# Patient Record
Sex: MALE | Race: WHITE | Employment: UNEMPLOYED | URBAN - METROPOLITAN AREA
[De-identification: names, ages, dates, MRNs, and addresses within clinical notes are randomized per-mention and may not be internally consistent; named-entity substitution may affect disease eponyms.]

---

## 2017-05-16 ENCOUNTER — HOSPITAL ENCOUNTER (EMERGENCY)
Facility: MEDICAL CENTER | Age: 57
End: 2017-05-16
Attending: EMERGENCY MEDICINE
Payer: MEDICAID

## 2017-05-16 VITALS
HEIGHT: 70 IN | OXYGEN SATURATION: 97 % | HEART RATE: 60 BPM | SYSTOLIC BLOOD PRESSURE: 148 MMHG | BODY MASS INDEX: 20.76 KG/M2 | WEIGHT: 145 LBS | RESPIRATION RATE: 18 BRPM | TEMPERATURE: 97 F | DIASTOLIC BLOOD PRESSURE: 90 MMHG

## 2017-05-16 DIAGNOSIS — F32.89 OTHER DEPRESSION: ICD-10-CM

## 2017-05-16 PROCEDURE — 90791 PSYCH DIAGNOSTIC EVALUATION: CPT

## 2017-05-16 PROCEDURE — 99284 EMERGENCY DEPT VISIT MOD MDM: CPT

## 2017-05-16 ASSESSMENT — PAIN SCALES - GENERAL: PAINLEVEL_OUTOF10: 0

## 2017-05-16 NOTE — ED AVS SNAPSHOT
Unique Microguides Access Code: [email or web address]  Expires: 6/15/2017  5:06 PM    Your email address is not on file at [facility].  Email Addresses are required for you to sign up for Unique Microguides, please contact [phone] to verify your personal information and to provide your email address prior to attempting to register for Unique Microguides.    [place] [address]    Diamond Mindt  A secure, online tool to manage your health information     [facility]’s Unique Microguides® is a secure, online tool that connects you to your personalized health information from the privacy of your home -- day or night - making it very easy for you to manage your healthcare. Once the activation process is completed, you can even access your medical information using the Unique Microguides anika, which is available for free in the Apple Anika store or Google Play store.     To learn more about Unique Microguides, visit [email or web address]    There are two levels of access available (as shown below):   My Chart Features  [facility] Primary Care Doctor [facility]  Specialists [facility]  Urgent  Care Non-[facility] Primary Care Doctor   Email your healthcare team securely and privately 24/7 X X X    Manage appointments: schedule your next appointment; view details of past/upcoming appointments X      Request prescription refills. X      View recent personal medical records, including lab and immunizations X X X X   View health record, including health history, allergies, medications X X X X   Read reports about your outpatient visits, procedures, consult and ER notes X X X X   See your discharge summary, which is a recap of your hospital and/or ER visit that includes your diagnosis, lab results, and care plan X X  X     How to register for [employer] Mindt:  Once your e-mail address has been verified, follow the following steps to sign up for Unique Microguides.     1. Go to  [email or web address]  2. Click on the Sign Up Now box, which takes you to the New Member Sign Up page. You will  need to provide the following information:  a. Enter your Threadflip Access Code exactly as it appears at the top of this page. (You will not need to use this code after you’ve completed the sign-up process. If you do not sign up before the expiration date, you must request a new code.)   b. Enter your date of birth.   c. Enter your home email address.   d. Click Submit, and follow the next screen’s instructions.  3. Create a [employer] ID. This will be your Threadflip login ID and cannot be changed, so think of one that is secure and easy to remember.  4. Create a Threadflip password. You can change your password at any time.  5. Enter your Password Reset Question and Answer. This can be used at a later time if you forget your password.   6. Enter your e-mail address. This allows you to receive e-mail notifications when new information is available in Threadflip.  7. Click Sign Up. You can now view your health information.    For assistance activating your Threadflip account, call [phone]

## 2017-05-16 NOTE — ED AVS SNAPSHOT
5/16/2017    Wilfrid Briceño  [facility] [address]    Dear Wilfrid:    [facility] wants to ensure your discharge home is safe and you or your loved ones have had all of your questions answered regarding your care after you leave the hospital.    Below is a list of resources and contact information should you have any questions regarding your hospital stay, follow-up instructions, or active medical symptoms.    Questions or Concerns Regarding… Contact   Medical Questions Related to Your Discharge  (7 days a week, 8am-5pm) Contact a Nurse Care Coordinator   [phone]   Medical Questions Not Related to Your Discharge  (24 hours a day / 7 days a week)  Contact the Nurse Health Line   [phone]    Medications or Discharge Instructions Refer to your discharge packet   or contact your [facility] Primary Care Provider   [phone]   Follow-up Appointment(s) Schedule your appointment via [email or web address]   or contact Scheduling [phone]   Billing Review your statement via [email or web address]  or contact Billing [phone]   Medical Records Review your records via [email or web address]   or contact Medical Records [phone]     You may receive a telephone call within two days of discharge. This call is to make certain you understand your discharge instructions and have the opportunity to have any questions answered. You can also easily access your medical information, test results and upcoming appointments via the [email or web address] free online health management tool. You can learn more and sign up at [email or web address]. For assistance setting up your [email or web address] account, please call [phone].    Once again, we want to ensure your discharge home is safe and that you have a clear understanding of any next steps in your care. If you have any questions or concerns, please do not hesitate to contact us, we are here for you. Thank you for choosing [facility] for your healthcare needs.    Sincerely,    Your [facility] Healthcare Team

## 2017-05-16 NOTE — DISCHARGE INSTRUCTIONS
Depression, Adult  Depression refers to feeling sad, low, down in the dumps, blue, gloomy, or empty. In general, there are two kinds of depression:  1. Normal sadness or normal grief. This kind of depression is one that we all feel from time to time after upsetting life experiences, such as the loss of a job or the ending of a relationship. This kind of depression is considered normal, is short lived, and resolves within a few days to 2 weeks. Depression experienced after the loss of a loved one (bereavement) often lasts longer than 2 weeks but normally gets better with time.  2. Clinical depression. This kind of depression lasts longer than normal sadness or normal grief or interferes with your ability to function at home, at work, and in school. It also interferes with your personal relationships. It affects almost every aspect of your life. Clinical depression is an illness.  Symptoms of depression can also be caused by conditions other than those mentioned above, such as:  · Physical illness. Some physical illnesses, including underactive thyroid gland (hypothyroidism), severe anemia, specific types of cancer, diabetes, uncontrolled seizures, heart and lung problems, strokes, and chronic pain are commonly associated with symptoms of depression.  · Side effects of some prescription medicine. In some people, certain types of medicine can cause symptoms of depression.  · Substance abuse. Abuse of alcohol and illicit drugs can cause symptoms of depression.  SYMPTOMS  Symptoms of normal sadness and normal grief include the following:  · Feeling sad or crying for short periods of time.  · Not caring about anything (apathy).  · Difficulty sleeping or sleeping too much.  · No longer able to enjoy the things you used to enjoy.  · Desire to be by oneself all the time (social isolation).  · Lack of energy or motivation.  · Difficulty concentrating or remembering.  · Change in appetite or weight.  · Restlessness or  agitation.  Symptoms of clinical depression include the same symptoms of normal sadness or normal grief and also the following symptoms:  · Feeling sad or crying all the time.  · Feelings of guilt or worthlessness.  · Feelings of hopelessness or helplessness.  · Thoughts of suicide or the desire to harm yourself (suicidal ideation).  · Loss of touch with reality (psychotic symptoms). Seeing or hearing things that are not real (hallucinations) or having false beliefs about your life or the people around you (delusions and paranoia).  DIAGNOSIS   The diagnosis of clinical depression is usually based on how bad the symptoms are and how long they have lasted. Your health care provider will also ask you questions about your medical history and substance use to find out if physical illness, use of prescription medicine, or substance abuse is causing your depression. Your health care provider may also order blood tests.  TREATMENT   Often, normal sadness and normal grief do not require treatment. However, sometimes antidepressant medicine is given for bereavement to ease the depressive symptoms until they resolve.  The treatment for clinical depression depends on how bad the symptoms are but often includes antidepressant medicine, counseling with a mental health professional, or both. Your health care provider will help to determine what treatment is best for you.  Depression caused by physical illness usually goes away with appropriate medical treatment of the illness. If prescription medicine is causing depression, talk with your health care provider about stopping the medicine, decreasing the dose, or changing to another medicine.  Depression caused by the abuse of alcohol or illicit drugs goes away when you stop using these substances. Some adults need professional help in order to stop drinking or using drugs.  SEEK IMMEDIATE MEDICAL CARE IF:  · You have thoughts about hurting yourself or others.  · You lose touch  with reality (have psychotic symptoms).  · You are taking medicine for depression and have a serious side effect.  FOR MORE INFORMATION  · National [place] on Mental Illness: www.coleman.org   · National [place] of Mental Health: www.nimh.nih.gov      This information is not intended to replace advice given to you by your health care provider. Make sure you discuss any questions you have with your health care provider.     Document Released: 12/15/2001 Document Revised: 01/08/2016 Document Reviewed: 03/18/2013  Elsevier Interactive Patient Education ©2016 Elsevier Inc.

## 2017-05-16 NOTE — ED PROVIDER NOTES
"ED Provider Note    Scribed for Christiano Forte M.D. by Familia Hunter. 5/16/2017, 4:08 PM.    Primary care provider: Pcp Pt States None  Means of arrival: Law Enforcement   History obtained from: Patient, Law Enforcement   History limited by: None     CHIEF COMPLAINT  Chief Complaint   Patient presents with   • Suicidal Ideation     Pt bib police. Pt released from [facility], reported to staff that he was \"suicidal\" and \"sometimes thought of standing in the way of a moving bus\". Pt denies any SI/HI. PT remained incredibly cooperative until the police overs removed shackles and left. Pt then became very upest and verbally aggressive with staff.  present. Pt educated what a legal 2000 is and that he cannot leave. PT demanding to go back to [facility]. Pt is a flight risk. Pt belongings removed from room/patient.   • Psych Eval     Potentionally dangerous items removed from room. Police report a knife in patient bag. Security searched bag and could not find knife. Pt states \"I ain't saying another word to no one if you are gonna keep me here\". Pt sitting in bed with arms crossed.        HPI  Wilfrid Briceño is a 56 y.o. male who was brought by Law Enforcement to the Emergency Department after being released from [facility]. Per nursing note, patient had complained of prior suicidal thoughts to [facility] staff. However, currently patient is denying any thoughts of suicidal ideation. He reports his prior suicidal thoughts were not not meant, currently manuel for safety. The patient has been in [facility] for the last 3 days secondary to trespassing. He denies any homicidal ideation or hallucinations.     REVIEW OF SYSTEMS  Pertinent negatives include no suicidal ideation, homicidal ideation, hallucinations. As above, all other systems reviewed and are negative.   See HPI for further details.     PAST MEDICAL HISTORY  No chronic past medical history.     SURGICAL HISTORY  patient denies any surgical history    SOCIAL " "HISTORY  Social History   Substance Use Topics   • Smoking status: Current Every Day Smoker -- 0.50 packs/day     Types: Cigarettes   • Smokeless tobacco: None   • Alcohol Use: No      History   Drug Use No       FAMILY HISTORY  History reviewed. No pertinent family history.    CURRENT MEDICATIONS  Home Medications     Reviewed by Lucrecia Ma R.N. (Registered Nurse) on 05/16/17 at 1542  Med List Status: Partial    Medication Last Dose Status    diphenhydrAMINE (BENADRYL) 25 MG capsule  Active                ALLERGIES  No Known Allergies    PHYSICAL EXAM  VITAL SIGNS: /90 mmHg  Pulse 60  Temp(Src) 36.1 °C (97 °F)  Resp 18  Ht 1.778 m (5' 10\")  Wt 65.772 kg (145 lb)  BMI 20.81 kg/m2  SpO2 97%  Vitals reviewed.  Constitutional: Alert, no apparent distress.   HENT: No signs of trauma, Bilateral external ears normal, Nose normal.   Eyes: Pupils are equal and reactive, Conjunctiva normal, Non-icteric.   Neck: Normal range of motion, No tenderness, Supple, No stridor.   Lymphatic: No lymphadenopathy noted.   Cardiovascular: Regular rate and rhythm, no murmurs.   Thorax & Lungs: Normal breath sounds, No respiratory distress, No wheezing, No chest tenderness.   Abdomen: Bowel sounds normal, Soft, No tenderness, No peritoneal signs, No masses, No pulsatile masses.   Skin: Warm, Dry, No erythema, No rash.   Extremities: Intact distal pulses, No edema, No tenderness, No cyanosis  Musculoskeletal: Good range of motion in all major joints. No tenderness to palpation or major deformities noted.   Neurologic: Alert , Normal motor function, Normal sensory function, No focal deficits noted.   Psychiatric: Denies suicidal thoughts. Normal affect.      COURSE & MEDICAL DECISION MAKING  Nursing notes, VS, PMSFHx reviewed in chart.    4:08 PM Patient seen and examined at bedside. [facility] will consult with patient for evaluation of his status. No breathalyzer ordered as patient has been in [facility] for the last 3 days, " and arrives to this ED directly from longterm.     4:42 PM Spoke with Kelli Thornton, about the patient's condition. After speaking with [facility] about their assessment of patient, they are agreeable that patient is stable at this time for discharge. They have given the patient resources for depression, he will return for suicidal thoughts and is manuel for safety.         The patient is referred to a primary physician for blood pressure management, diabetic screening, and for all other preventative health concerns.    Patient was referred to [facility] to establish a primary care physician.       DISPOSITION:  Patient will be discharged home in stable condition.    FOLLOW UP:  [facility], Emergency Dept  [address] [facility] [id number] [phone]    If symptoms worsen    [facility]  [address] [address]  [phone]    call for follow up      FINAL IMPRESSION  1. Other depression          Familia NUNEZ (Scottiblisa), am scribing for, and in the presence of, Christiano Forte M.D..    Electronically signed by: Familia Hunter (Mikey), 5/16/2017    IChristiano M.D. personally performed the services described in this documentation, as scribed by Familia Hunter in my presence, and it is both accurate and complete.    The note accurately reflects work and decisions made by me.  Christiano Forte  5/16/2017  5:28 PM

## 2017-05-16 NOTE — ED AVS SNAPSHOT
Home Care Instructions                                                                                                                Wilfrid Briceño   MRN: [id number]    Department:  [facility], Emergency Dept   Date of Visit:  5/16/2017            [facility], Emergency Dept    [address] [address]    Phone:  [phone]      You were seen by     Christiano Forte M.D.      Your Diagnosis Was     Other depression     F32.89       Follow-up Information     1. Follow up with [facility], Emergency Dept.    Specialty:  Emergency Medicine    Why:  If symptoms worsen    Contact information    [address] [place] [id number] [phone]        2. Follow up with [facility].    Why:  call for follow up    Contact information    [address] [place] [address] [phone]      Medication Information     Review all of your home medications and newly ordered medications with your primary doctor and/or pharmacist as soon as possible. Follow medication instructions as directed by your doctor and/or pharmacist.     Please keep your complete medication list with you and share with your physician. Update the information when medications are discontinued, doses are changed, or new medications (including over-the-counter products) are added; and carry medication information at all times in the event of emergency situations.               Medication List      ASK your doctor about these medications        Instructions    Morning Afternoon Evening Bedtime    diphenhydrAMINE 25 MG capsule   Commonly known as:  BENADRYL        Take 1-2 Caps by mouth every 6 hours as needed for Itching or Rash.   Dose:  25-50 mg                                Procedures and tests performed during your visit     ED CONSULT TO BEHAVIORAL HEALTH        Discharge Instructions       Depression, Adult  Depression refers to feeling sad, low, down in the  dumps, blue, gloomy, or empty. In general, there are two kinds of depression:  1. Normal sadness or normal grief. This kind of depression is one that we all feel from time to time after upsetting life experiences, such as the loss of a job or the ending of a relationship. This kind of depression is considered normal, is short lived, and resolves within a few days to 2 weeks. Depression experienced after the loss of a loved one (bereavement) often lasts longer than 2 weeks but normally gets better with time.  2. Clinical depression. This kind of depression lasts longer than normal sadness or normal grief or interferes with your ability to function at home, at work, and in school. It also interferes with your personal relationships. It affects almost every aspect of your life. Clinical depression is an illness.  Symptoms of depression can also be caused by conditions other than those mentioned above, such as:  · Physical illness. Some physical illnesses, including underactive thyroid gland (hypothyroidism), severe anemia, specific types of cancer, diabetes, uncontrolled seizures, heart and lung problems, strokes, and chronic pain are commonly associated with symptoms of depression.  · Side effects of some prescription medicine. In some people, certain types of medicine can cause symptoms of depression.  · Substance abuse. Abuse of alcohol and illicit drugs can cause symptoms of depression.  SYMPTOMS  Symptoms of normal sadness and normal grief include the following:  · Feeling sad or crying for short periods of time.  · Not caring about anything (apathy).  · Difficulty sleeping or sleeping too much.  · No longer able to enjoy the things you used to enjoy.  · Desire to be by oneself all the time (social isolation).  · Lack of energy or motivation.  · Difficulty concentrating or remembering.  · Change in appetite or weight.  · Restlessness or agitation.  Symptoms of clinical depression include the same symptoms of  normal sadness or normal grief and also the following symptoms:  · Feeling sad or crying all the time.  · Feelings of guilt or worthlessness.  · Feelings of hopelessness or helplessness.  · Thoughts of suicide or the desire to harm yourself (suicidal ideation).  · Loss of touch with reality (psychotic symptoms). Seeing or hearing things that are not real (hallucinations) or having false beliefs about your life or the people around you (delusions and paranoia).  DIAGNOSIS   The diagnosis of clinical depression is usually based on how bad the symptoms are and how long they have lasted. Your health care provider will also ask you questions about your medical history and substance use to find out if physical illness, use of prescription medicine, or substance abuse is causing your depression. Your health care provider may also order blood tests.  TREATMENT   Often, normal sadness and normal grief do not require treatment. However, sometimes antidepressant medicine is given for bereavement to ease the depressive symptoms until they resolve.  The treatment for clinical depression depends on how bad the symptoms are but often includes antidepressant medicine, counseling with a mental health professional, or both. Your health care provider will help to determine what treatment is best for you.  Depression caused by physical illness usually goes away with appropriate medical treatment of the illness. If prescription medicine is causing depression, talk with your health care provider about stopping the medicine, decreasing the dose, or changing to another medicine.  Depression caused by the abuse of alcohol or illicit drugs goes away when you stop using these substances. Some adults need professional help in order to stop drinking or using drugs.  SEEK IMMEDIATE MEDICAL CARE IF:  · You have thoughts about hurting yourself or others.  · You lose touch with reality (have psychotic symptoms).  · You are taking medicine for  depression and have a serious side effect.  FOR MORE INFORMATION  · National [place] on Mental Illness: www.coleman.org   · National [place] of Mental Health: www.nimh.nih.gov      This information is not intended to replace advice given to you by your health care provider. Make sure you discuss any questions you have with your health care provider.     Document Released: 12/15/2001 Document Revised: 01/08/2016 Document Reviewed: 03/18/2013  [employer] Interactive Patient Education ©2016 [employer]            Patient Information     Patient Information    Following emergency treatment: all patient requiring follow-up care must return either to a private physician or a clinic if your condition worsens before you are able to obtain further medical attention, please return to the emergency room.     Billing Information    At [facility], we work to make the billing process streamlined for our patients.  Our Representatives are here to answer any questions you may have regarding your hospital bill.  If you have insurance coverage and have supplied your insurance information to us, we will submit a claim to your insurer on your behalf.  Should you have any questions regarding your bill, we can be reached online or by phone as follows:  Online: You are able pay your bills online or live chat with our representatives about any billing questions you may have. We are here to help Monday - Friday from 8:00am to 7:30pm and 9:00am - 12:00pm on Saturdays.  Please visit [email or web address]  for more information.   Phone:  [phone] or [phone]    Please note that your emergency physician, surgeon, pathologist, radiologist, anesthesiologist, and other specialists are not employed by [facility] and will therefore bill separately for their services.  Please contact them directly for any questions concerning their bills at the numbers below:     Emergency Physician Services:   [phone]  [facility]:  [phone]  [facility]:  [phone]  [facility]:  [phone]    1. Your final bill may vary from the amount quoted upon discharge if all procedures are not complete at that time, or if your doctor has additional procedures of which we are not aware. You will receive an additional bill if you return to the Emergency Department at [facility] for suture removal regardless of the facility of which the sutures were placed.     2. Please arrange for settlement of this account at the emergency registration.    3. All self-pay accounts are due in full at the time of treatment.  If you are unable to meet this obligation then payment is expected within 4-5 days.     4. If you have had radiology studies (CT, X-ray, Ultrasound, MRI), you have received a preliminary result during your emergency department visit. Please contact the radiology department [phone] to receive a copy of your final result. Please discuss the Final result with your primary physician or with the follow up physician provided.     Crisis Hotline:  [place] Crisis Hotline:  2-126-EMUBVAA or 1-584.899.3005  [place] Crisis Hotline:    1-884.822.9903 or 505-931-0539         ED Discharge Follow Up Questions    1. In order to provide you with very good care, we would like to follow up with a phone call in the next few days.  May we have your permission to contact you?     YES /  NO    2. What is the best phone number to call you? (       )_____-__________    3. What is the best time to call you?      Morning  /  Afternoon  /  Evening                   Patient Signature:  ____________________________________________________________    Date:  ____________________________________________________________

## 2017-05-17 NOTE — ED NOTES
Pt denies SI/HI at this time. Pt informed that if at any point, he began having suicidal thoughts, he needs to return. Verbalized understanding and agreed. Pt agreed not to hurt himself. Ambulatory, steady gait.
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
## 2017-05-17 NOTE — ED NOTES
"PT given back his personal belongings, pt is extremely apologetic for his behavior upon arrival. PT tearful that he was so \"mean and disrespectful to me\". Pt consoled and provided emotional support. Food offered to patient.   "

## 2018-12-03 ENCOUNTER — HOSPITAL ENCOUNTER (EMERGENCY)
Facility: MEDICAL CENTER | Age: 58
End: 2018-12-03
Attending: EMERGENCY MEDICINE

## 2018-12-03 ENCOUNTER — APPOINTMENT (OUTPATIENT)
Dept: RADIOLOGY | Facility: MEDICAL CENTER | Age: 58
End: 2018-12-03
Attending: EMERGENCY MEDICINE

## 2018-12-03 VITALS
DIASTOLIC BLOOD PRESSURE: 91 MMHG | HEART RATE: 88 BPM | RESPIRATION RATE: 16 BRPM | WEIGHT: 145 LBS | HEIGHT: 70 IN | TEMPERATURE: 97.5 F | BODY MASS INDEX: 20.76 KG/M2 | SYSTOLIC BLOOD PRESSURE: 155 MMHG | OXYGEN SATURATION: 91 %

## 2018-12-03 DIAGNOSIS — S22.42XA CLOSED FRACTURE OF MULTIPLE RIBS OF LEFT SIDE, INITIAL ENCOUNTER: ICD-10-CM

## 2018-12-03 PROCEDURE — 700102 HCHG RX REV CODE 250 W/ 637 OVERRIDE(OP): Performed by: EMERGENCY MEDICINE

## 2018-12-03 PROCEDURE — 71101 X-RAY EXAM UNILAT RIBS/CHEST: CPT | Mod: LT

## 2018-12-03 PROCEDURE — A9270 NON-COVERED ITEM OR SERVICE: HCPCS | Performed by: EMERGENCY MEDICINE

## 2018-12-03 PROCEDURE — 99284 EMERGENCY DEPT VISIT MOD MDM: CPT

## 2018-12-03 RX ORDER — OXYCODONE HYDROCHLORIDE AND ACETAMINOPHEN 5; 325 MG/1; MG/1
1 TABLET ORAL ONCE
Status: COMPLETED | OUTPATIENT
Start: 2018-12-03 | End: 2018-12-03

## 2018-12-03 RX ORDER — OXYCODONE HYDROCHLORIDE AND ACETAMINOPHEN 5; 325 MG/1; MG/1
2 TABLET ORAL ONCE
Status: COMPLETED | OUTPATIENT
Start: 2018-12-03 | End: 2018-12-03

## 2018-12-03 RX ORDER — OXYCODONE HYDROCHLORIDE AND ACETAMINOPHEN 5; 325 MG/1; MG/1
1-2 TABLET ORAL EVERY 6 HOURS PRN
Qty: 40 TAB | Refills: 0 | Status: SHIPPED | OUTPATIENT
Start: 2018-12-03 | End: 2018-12-04

## 2018-12-03 RX ADMIN — OXYCODONE AND ACETAMINOPHEN 1 TABLET: 5; 325 TABLET ORAL at 17:45

## 2018-12-03 RX ADMIN — OXYCODONE AND ACETAMINOPHEN 1 TABLET: 5; 325 TABLET ORAL at 17:30
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
## 2018-12-04 ENCOUNTER — APPOINTMENT (OUTPATIENT)
Dept: RADIOLOGY | Facility: MEDICAL CENTER | Age: 58
End: 2018-12-04
Attending: EMERGENCY MEDICINE

## 2018-12-04 ENCOUNTER — HOSPITAL ENCOUNTER (EMERGENCY)
Facility: MEDICAL CENTER | Age: 58
End: 2018-12-05
Attending: EMERGENCY MEDICINE

## 2018-12-04 VITALS
WEIGHT: 145 LBS | BODY MASS INDEX: 20.81 KG/M2 | OXYGEN SATURATION: 90 % | TEMPERATURE: 98 F | HEART RATE: 65 BPM | SYSTOLIC BLOOD PRESSURE: 130 MMHG | RESPIRATION RATE: 16 BRPM | DIASTOLIC BLOOD PRESSURE: 88 MMHG

## 2018-12-04 DIAGNOSIS — S22.42XA CLOSED FRACTURE OF MULTIPLE RIBS OF LEFT SIDE, INITIAL ENCOUNTER: ICD-10-CM

## 2018-12-04 DIAGNOSIS — R09.02 HYPOXIA: ICD-10-CM

## 2018-12-04 LAB
ALBUMIN SERPL BCP-MCNC: 4.4 G/DL (ref 3.2–4.9)
ALBUMIN/GLOB SERPL: 1.5 G/DL
ALP SERPL-CCNC: 82 U/L (ref 30–99)
ALT SERPL-CCNC: 14 U/L (ref 2–50)
ANION GAP SERPL CALC-SCNC: 5 MMOL/L (ref 0–11.9)
APTT PPP: 28.5 SEC (ref 24.7–36)
AST SERPL-CCNC: 19 U/L (ref 12–45)
BASOPHILS # BLD AUTO: 0.7 % (ref 0–1.8)
BASOPHILS # BLD: 0.07 K/UL (ref 0–0.12)
BILIRUB SERPL-MCNC: 0.7 MG/DL (ref 0.1–1.5)
BUN SERPL-MCNC: 17 MG/DL (ref 8–22)
CALCIUM SERPL-MCNC: 9.8 MG/DL (ref 8.5–10.5)
CHLORIDE SERPL-SCNC: 102 MMOL/L (ref 96–112)
CO2 SERPL-SCNC: 30 MMOL/L (ref 20–33)
CREAT SERPL-MCNC: 0.97 MG/DL (ref 0.5–1.4)
EOSINOPHIL # BLD AUTO: 0.15 K/UL (ref 0–0.51)
EOSINOPHIL NFR BLD: 1.4 % (ref 0–6.9)
ERYTHROCYTE [DISTWIDTH] IN BLOOD BY AUTOMATED COUNT: 42.9 FL (ref 35.9–50)
GLOBULIN SER CALC-MCNC: 3 G/DL (ref 1.9–3.5)
GLUCOSE SERPL-MCNC: 93 MG/DL (ref 65–99)
HCT VFR BLD AUTO: 51.3 % (ref 42–52)
HGB BLD-MCNC: 17.4 G/DL (ref 14–18)
IMM GRANULOCYTES # BLD AUTO: 0.05 K/UL (ref 0–0.11)
IMM GRANULOCYTES NFR BLD AUTO: 0.5 % (ref 0–0.9)
INR PPP: 1 (ref 0.87–1.13)
LYMPHOCYTES # BLD AUTO: 2.28 K/UL (ref 1–4.8)
LYMPHOCYTES NFR BLD: 21.4 % (ref 22–41)
MCH RBC QN AUTO: 31 PG (ref 27–33)
MCHC RBC AUTO-ENTMCNC: 33.9 G/DL (ref 33.7–35.3)
MCV RBC AUTO: 91.4 FL (ref 81.4–97.8)
MONOCYTES # BLD AUTO: 0.81 K/UL (ref 0–0.85)
MONOCYTES NFR BLD AUTO: 7.6 % (ref 0–13.4)
NEUTROPHILS # BLD AUTO: 7.28 K/UL (ref 1.82–7.42)
NEUTROPHILS NFR BLD: 68.4 % (ref 44–72)
NRBC # BLD AUTO: 0 K/UL
NRBC BLD-RTO: 0 /100 WBC
PLATELET # BLD AUTO: 399 K/UL (ref 164–446)
PMV BLD AUTO: 9.3 FL (ref 9–12.9)
POTASSIUM SERPL-SCNC: 4.3 MMOL/L (ref 3.6–5.5)
PROT SERPL-MCNC: 7.4 G/DL (ref 6–8.2)
PROTHROMBIN TIME: 13.3 SEC (ref 12–14.6)
RBC # BLD AUTO: 5.61 M/UL (ref 4.7–6.1)
SODIUM SERPL-SCNC: 137 MMOL/L (ref 135–145)
WBC # BLD AUTO: 10.6 K/UL (ref 4.8–10.8)

## 2018-12-04 PROCEDURE — 700102 HCHG RX REV CODE 250 W/ 637 OVERRIDE(OP): Performed by: EMERGENCY MEDICINE

## 2018-12-04 PROCEDURE — 85610 PROTHROMBIN TIME: CPT

## 2018-12-04 PROCEDURE — 85730 THROMBOPLASTIN TIME PARTIAL: CPT

## 2018-12-04 PROCEDURE — 700101 HCHG RX REV CODE 250: Performed by: EMERGENCY MEDICINE

## 2018-12-04 PROCEDURE — A9270 NON-COVERED ITEM OR SERVICE: HCPCS | Performed by: EMERGENCY MEDICINE

## 2018-12-04 PROCEDURE — 700117 HCHG RX CONTRAST REV CODE 255: Performed by: EMERGENCY MEDICINE

## 2018-12-04 PROCEDURE — 700111 HCHG RX REV CODE 636 W/ 250 OVERRIDE (IP): Performed by: EMERGENCY MEDICINE

## 2018-12-04 PROCEDURE — 304561 HCHG STAT O2

## 2018-12-04 PROCEDURE — 99284 EMERGENCY DEPT VISIT MOD MDM: CPT

## 2018-12-04 PROCEDURE — 80053 COMPREHEN METABOLIC PANEL: CPT

## 2018-12-04 PROCEDURE — 71260 CT THORAX DX C+: CPT

## 2018-12-04 PROCEDURE — 85025 COMPLETE CBC W/AUTO DIFF WBC: CPT

## 2018-12-04 PROCEDURE — 96375 TX/PRO/DX INJ NEW DRUG ADDON: CPT

## 2018-12-04 PROCEDURE — 96374 THER/PROPH/DIAG INJ IV PUSH: CPT

## 2018-12-04 RX ORDER — LIDOCAINE 50 MG/G
1 PATCH TOPICAL EVERY 24 HOURS
Qty: 10 PATCH | Refills: 0 | Status: SHIPPED | OUTPATIENT
Start: 2018-12-04

## 2018-12-04 RX ORDER — MORPHINE SULFATE 10 MG/ML
6 INJECTION, SOLUTION INTRAMUSCULAR; INTRAVENOUS ONCE
Status: COMPLETED | OUTPATIENT
Start: 2018-12-04 | End: 2018-12-04

## 2018-12-04 RX ORDER — LIDOCAINE 50 MG/G
2 PATCH TOPICAL ONCE
Status: DISCONTINUED | OUTPATIENT
Start: 2018-12-04 | End: 2018-12-05 | Stop reason: HOSPADM

## 2018-12-04 RX ORDER — GABAPENTIN 400 MG/1
400 CAPSULE ORAL 3 TIMES DAILY
Qty: 30 CAP | Refills: 0 | Status: SHIPPED | OUTPATIENT
Start: 2018-12-04

## 2018-12-04 RX ORDER — OXYCODONE HYDROCHLORIDE AND ACETAMINOPHEN 5; 325 MG/1; MG/1
1 TABLET ORAL ONCE
Status: COMPLETED | OUTPATIENT
Start: 2018-12-04 | End: 2018-12-04

## 2018-12-04 RX ORDER — ONDANSETRON 2 MG/ML
4 INJECTION INTRAMUSCULAR; INTRAVENOUS ONCE
Status: COMPLETED | OUTPATIENT
Start: 2018-12-04 | End: 2018-12-04

## 2018-12-04 RX ORDER — IBUPROFEN 600 MG/1
600 TABLET ORAL EVERY 6 HOURS PRN
Qty: 15 TAB | Refills: 0 | Status: SHIPPED | OUTPATIENT
Start: 2018-12-04

## 2018-12-04 RX ADMIN — ONDANSETRON 4 MG: 2 INJECTION INTRAMUSCULAR; INTRAVENOUS at 18:10

## 2018-12-04 RX ADMIN — MORPHINE SULFATE 6 MG: 10 INJECTION INTRAVENOUS at 18:11

## 2018-12-04 RX ADMIN — FENTANYL CITRATE 25 MCG: 50 INJECTION, SOLUTION INTRAMUSCULAR; INTRAVENOUS at 20:15

## 2018-12-04 RX ADMIN — OXYCODONE AND ACETAMINOPHEN 1 TABLET: 5; 325 TABLET ORAL at 21:54

## 2018-12-04 RX ADMIN — LIDOCAINE 2 PATCH: 50 PATCH TOPICAL at 22:40

## 2018-12-04 RX ADMIN — IOHEXOL 100 ML: 350 INJECTION, SOLUTION INTRAVENOUS at 20:06

## 2018-12-04 RX ADMIN — FENTANYL CITRATE 75 MCG: 50 INJECTION, SOLUTION INTRAMUSCULAR; INTRAVENOUS at 20:00

## 2018-12-04 ASSESSMENT — PAIN SCALES - GENERAL
PAINLEVEL_OUTOF10: 4
PAINLEVEL_OUTOF10: 4
PAINLEVEL_OUTOF10: ASSUMED PAIN PRESENT

## 2018-12-04 NOTE — DISCHARGE PLANNING
Pt does not have Medicaid and refused to speak with PAR when they went in room.  Pt will be admitted for pain control.

## 2018-12-05 NOTE — ED NOTES
Patient discharged with instructions for rib fracures with prescriptions x3 signs and symptoms explained to patient for reasons to return to emergency department, Patient is understanding and has no further questions. Social work visited pt and provided him with information for prescription assistance, pt telling RN and staff to get out of the room and leave him alone. Pt ambulatory with a slow steady gait,  offered to call shelter and provide paula pass but pt refused.

## 2018-12-05 NOTE — ED NOTES
Report given to Lynne JENKINS; pt to be transferred to [facility] due to order for cardiac monitoring.

## 2018-12-05 NOTE — ED NOTES
Call from CT tech at 1955 for patient not being able to tolerate scan related to pain. Telephone order to give 75 mcg fentanyl by Dr. Ganser and additional Fentanyl if needed. RN responded to CT scanner and provided pain medications. CT needed patient off table to scan another person. Pt moved to [facility] by Tech and RN with help of CT. Pt given additional 25 mcg fentanyl prior to moving back onto CT table. Pt was able to tolerate scan. RN brought patient back to
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
## 2018-12-05 NOTE — ED NOTES
PIV initiated.  Pt medicated per mar.  Pt being moved to yellow pod for cardiac monitoring.  Report to Lynne JENKINS.
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
## 2018-12-05 NOTE — ED NOTES
Rounded on patient.  Pt resting comfortably.  Pt placed on o2 @ 2lpm n/c for spo2 88-89%.  Pt awaiting CT scan, updated on POC.  All needs met.
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
## 2020-04-10 ENCOUNTER — HOSPITAL ENCOUNTER (EMERGENCY)
Facility: MEDICAL CENTER | Age: 60
End: 2020-04-10
Attending: EMERGENCY MEDICINE
Payer: MEDICAID

## 2020-04-10 ENCOUNTER — APPOINTMENT (OUTPATIENT)
Dept: RADIOLOGY | Facility: MEDICAL CENTER | Age: 60
End: 2020-04-10
Attending: EMERGENCY MEDICINE
Payer: MEDICAID

## 2020-04-10 VITALS
SYSTOLIC BLOOD PRESSURE: 123 MMHG | TEMPERATURE: 97.1 F | DIASTOLIC BLOOD PRESSURE: 88 MMHG | OXYGEN SATURATION: 100 % | RESPIRATION RATE: 18 BRPM | HEIGHT: 70 IN | WEIGHT: 153 LBS | HEART RATE: 97 BPM | BODY MASS INDEX: 21.9 KG/M2

## 2020-04-10 DIAGNOSIS — M62.830 BACK SPASM: ICD-10-CM

## 2020-04-10 DIAGNOSIS — M54.50 LOW BACK PAIN, UNSPECIFIED BACK PAIN LATERALITY, UNSPECIFIED CHRONICITY, UNSPECIFIED WHETHER SCIATICA PRESENT: ICD-10-CM

## 2020-04-10 PROCEDURE — 99284 EMERGENCY DEPT VISIT MOD MDM: CPT

## 2020-04-10 PROCEDURE — 71045 X-RAY EXAM CHEST 1 VIEW: CPT

## 2020-04-10 PROCEDURE — 700102 HCHG RX REV CODE 250 W/ 637 OVERRIDE(OP): Performed by: EMERGENCY MEDICINE

## 2020-04-10 PROCEDURE — A9270 NON-COVERED ITEM OR SERVICE: HCPCS | Performed by: EMERGENCY MEDICINE

## 2020-04-10 RX ORDER — CYCLOBENZAPRINE HCL 5 MG
5-10 TABLET ORAL 3 TIMES DAILY PRN
Qty: 30 TAB | Refills: 0 | Status: SHIPPED | OUTPATIENT
Start: 2020-04-10

## 2020-04-10 RX ORDER — CYCLOBENZAPRINE HCL 10 MG
10 TABLET ORAL ONCE
Status: COMPLETED | OUTPATIENT
Start: 2020-04-10 | End: 2020-04-10

## 2020-04-10 RX ORDER — ACETAMINOPHEN 325 MG/1
650 TABLET ORAL ONCE
Status: COMPLETED | OUTPATIENT
Start: 2020-04-10 | End: 2020-04-10

## 2020-04-10 RX ADMIN — CYCLOBENZAPRINE HYDROCHLORIDE 10 MG: 10 TABLET, FILM COATED ORAL at 11:17

## 2020-04-10 RX ADMIN — ACETAMINOPHEN 650 MG: 325 TABLET, FILM COATED ORAL at 09:55

## 2020-04-10 ASSESSMENT — FIBROSIS 4 INDEX: FIB4 SCORE: 0.74

## 2020-04-10 NOTE — ED PROVIDER NOTES
ED Provider Note    ER PROVIDER NOTE      CHIEF COMPLAINT  Chief Complaint   Patient presents with   • Sore Throat   • Cough       HPI  Wilfrid Briceño is a 59 y.o. male who presents to the emergency department complaining of back spasms.  Patient reports that over the last day he has had increased back pain and some spasm.  He reports it is bilateral, low, there is no radiation.  Seems to be worse with twisting or moving.  States he has had issues with his back before although denies any new injury.  He denies any focal weakness numbness or tingling.  No abdominal pain nausea vomiting.  No fevers or chills.  No abnormal weight loss.  No bowel or bladder incontinence or retention.  Per triage note, patient was reporting some cough and sore throat, patient denies this to me    REVIEW OF SYSTEMS  Pertinent positives include back pain. Pertinent negatives include no weakness or numbness. See HPI for details. All other systems reviewed and are negative.    PAST MEDICAL HISTORY       SURGICAL HISTORY  patient denies any surgical history    FAMILY HISTORY  No family history on file.    SOCIAL HISTORY  Social History     Socioeconomic History   • Marital status: Single     Spouse name: Not on file   • Number of children: Not on file   • Years of education: Not on file   • Highest education level: Not on file   Occupational History   • Not on file   Social Needs   • Financial resource strain: Not on file   • Food insecurity     Worry: Not on file     Inability: Not on file   • Transportation needs     Medical: Not on file     Non-medical: Not on file   Tobacco Use   • Smoking status: Current Every Day Smoker     Packs/day: 0.50     Types: Cigarettes   Substance and Sexual Activity   • Alcohol use: No   • Drug use: No   • Sexual activity: Not on file   Lifestyle   • Physical activity     Days per week: Not on file     Minutes per session: Not on file   • Stress: Not on file   Relationships   • Social connections     Talks  "on phone: Not on file     Gets together: Not on file     Attends Worship service: Not on file     Active member of club or organization: Not on file     Attends meetings of clubs or organizations: Not on file     Relationship status: Not on file   • Intimate partner violence     Fear of current or ex partner: Not on file     Emotionally abused: Not on file     Physically abused: Not on file     Forced sexual activity: Not on file   Other Topics Concern   • Not on file   Social History Narrative   • Not on file      Social History     Substance and Sexual Activity   Drug Use No       CURRENT MEDICATIONS  Home Medications    **Home medications have not yet been reviewed for this encounter**         ALLERGIES  No Known Allergies    PHYSICAL EXAM  VITAL SIGNS: /97   Pulse 98   Temp 36.2 °C (97.1 °F) (Temporal)   Resp 16   Ht 1.778 m (5' 10\")   Wt 69.4 kg (153 lb)   SpO2 100%   BMI 21.95 kg/m²   Pulse ox interpretation: I interpret this pulse ox as normal.    Constitutional: Alert in no apparent distress.  HENT: No signs of trauma, Bilateral external ears normal, Nose normal.   Eyes: Pupils are equal and reactive, Conjunctiva normal, Non-icteric.   Neck: Normal range of motion, No tenderness, Supple, No stridor.   Lymphatic: No lymphadenopathy noted.   Cardiovascular: Regular rate and rhythm, no murmurs.   Thorax & Lungs: Normal breath sounds, No respiratory distress, No wheezing, No chest tenderness.   Abdomen: Bowel sounds normal, Soft, No tenderness, No masses, No pulsatile masses. No peritoneal signs.  Skin: Warm, Dry, No erythema, No rash.   Back: No bony tenderness, mild paraspinous tenderness with some spasm mid lumbar, no CVA tenderness.   Extremities: Intact distal pulses, No edema, No tenderness, No cyanosis  Musculoskeletal: Good range of motion in all major joints. No tenderness to palpation or major deformities noted.   Neurologic: Alert , strength is 5 out of 5 with bilateral lower " extremities, hip flexion and extension, knee flexion extension, dorsiflexion and plantar flexion of feet normal motor function, Normal sensory function, No focal deficits noted.   Psychiatric: Affect normal, Judgment normal, Mood normal.     DIAGNOSTIC STUDIES / PROCEDURES      RADIOLOGY  DX-CHEST-PORTABLE (1 VIEW)   Final Result      No evidence of acute cardiopulmonary process.        The radiologist's interpretation of all radiological studies have been reviewed by me.    COURSE & MEDICAL DECISION MAKING  Nursing notes, VS, PMSFHx reviewed in chart.    9:48 AM Patient seen and examined at bedside.     Patient is treated with acetaminophen and Flexeril, with some improvement.  Will plan for discharge    This patient was cared for during the COVID-19 pandemic.  History and physical exam may be limited/truncated by the inherent challenges of PPE and the need to decrease staff exposure to novel coronavirus.  Some aspects of disease management may be different to protect staff and help slow the spread of disease. I verified that, if possible, the patient was wearing a mask and I was wearing appropriate PPE every time I encountered the patient.     Current [facility] COVID19 protocol followed           Decision Making:  This is a 59 y.o. male presenting with low back pain.  This does seem more muscular in nature, will prescribe Flexeril, acetaminophen, outpatient follow-up. No focal neuro sx, saddle anesthesia, bowel/bladder sx to suggest cord compromise, cauda equina or spinal compression syndrome, afebrile and no systemic infx sx or immunocompromise or midline tenderness to suggest focal infection such as discitis or abscess, no new trauma or midline tenderness to suggest fracture, no flank or abdominal pain or urinary sx to suggest renal or abdominal pathology such as AAA, patient denies any sort of respiratory symptoms or fevers    Pt dc to home with outpt fu. Strict return precautions given regarding new neuro sx,  urinary sx, pain, fever, etc. Pt understood well      The patient will return for new or worsening symptoms and is stable at the time of discharge.    The patient is referred to a primary physician for blood pressure management, diabetic screening, and for all other preventative health concerns.      DISPOSITION:  Patient will be discharged home in stable condition.    FOLLOW UP:  [address] [id number] [phone]  Schedule an appointment as soon as possible for a visit         OUTPATIENT MEDICATIONS:  New Prescriptions    CYCLOBENZAPRINE (FLEXERIL) 5 MG TABLET    Take 1-2 Tabs by mouth 3 times a day as needed for Muscle Spasms.         FINAL IMPRESSION  1. Low back pain, unspecified back pain laterality, unspecified chronicity, unspecified whether sciatica present    2. Back spasm         The note accurately reflects work and decisions made by me.  Sukh Claros M.D.  4/10/2020  11:08 AM

## 2020-04-10 NOTE — ED NOTES
Pt resting quietly in bed. Respirations even and unlabored. No complaints at this time. Call light within reach. Will continue to monitor.

## 2020-04-10 NOTE — ED NOTES
Pt verbally understands d/c instructions. Pt given 1 paper prescription. All questions answered. Pt stable and ambulatory upon discharge.

## 2020-04-10 NOTE — ED TRIAGE NOTES
Pt brought in by ems for cough and sore throat since yesterday and severe lower back pain x14 hours. Pt reports he is having back spasms. Pt is homeless and reports he has had a cough for a couple weeks.     Pt screened for [place] COVID-19. Pt denies travel out of the state and country within the past two weeks. Pt denies being in contact with any person with confirmed COVID-19 that he is aware of . Pt denies unexplained fever and sob.

## 2021-03-15 DIAGNOSIS — Z23 NEED FOR VACCINATION: ICD-10-CM

## 2021-11-17 ENCOUNTER — HOSPITAL ENCOUNTER (EMERGENCY)
Facility: MEDICAL CENTER | Age: 61
End: 2021-11-17
Attending: EMERGENCY MEDICINE
Payer: MEDICAID

## 2021-11-17 ENCOUNTER — PHARMACY VISIT (OUTPATIENT)
Dept: PHARMACY | Facility: MEDICAL CENTER | Age: 61
End: 2021-11-17
Payer: COMMERCIAL

## 2021-11-17 VITALS
WEIGHT: 145.94 LBS | BODY MASS INDEX: 20.89 KG/M2 | HEART RATE: 66 BPM | TEMPERATURE: 96.6 F | DIASTOLIC BLOOD PRESSURE: 91 MMHG | OXYGEN SATURATION: 97 % | SYSTOLIC BLOOD PRESSURE: 130 MMHG | HEIGHT: 70 IN | RESPIRATION RATE: 15 BRPM

## 2021-11-17 DIAGNOSIS — B86 SCABIES: ICD-10-CM

## 2021-11-17 PROCEDURE — 99283 EMERGENCY DEPT VISIT LOW MDM: CPT

## 2021-11-17 PROCEDURE — A9270 NON-COVERED ITEM OR SERVICE: HCPCS | Performed by: EMERGENCY MEDICINE

## 2021-11-17 PROCEDURE — 700102 HCHG RX REV CODE 250 W/ 637 OVERRIDE(OP): Performed by: EMERGENCY MEDICINE

## 2021-11-17 PROCEDURE — RXMED WILLOW AMBULATORY MEDICATION CHARGE: Performed by: EMERGENCY MEDICINE

## 2021-11-17 RX ORDER — IVERMECTIN 3 MG/1
TABLET ORAL
Qty: 4 TABLET | Refills: 0 | OUTPATIENT
Start: 2021-11-17

## 2021-11-17 RX ORDER — IVERMECTIN 3 MG/1
12 TABLET ORAL ONCE
Status: COMPLETED | OUTPATIENT
Start: 2021-11-17 | End: 2021-11-17

## 2021-11-17 RX ORDER — IVERMECTIN 3 MG/1
150 TABLET ORAL ONCE
Qty: 4 TABLET | Refills: 0 | Status: SHIPPED | OUTPATIENT
Start: 2021-11-17 | End: 2021-11-17

## 2021-11-17 RX ADMIN — IVERMECTIN 12 MG: 3 TABLET ORAL at 15:44

## 2021-11-17 NOTE — DISCHARGE PLANNING
Pt to be discharged on Ivermectin for scabies treatment. He will receive a dose here and needs another dose on December 1st.    CM spoke with [facility] ([facility]) who verified they have medication and are able to fill for Dec 1st. Rx faxed to pharmacy.    BSN updated and pt is able to walk up to the pharmacy to .     CM will have DPA scan original script.

## 2021-11-17 NOTE — ED NOTES
Soc Wrkr getting home meds filled via [facility].   Pt is going to walk upstairs to fill his rx.

## 2021-11-17 NOTE — ED TRIAGE NOTES
.  Chief Complaint   Patient presents with   • Itching     reports head to toe itching x 3 weeks    • Rash     Pt reports having scabies. Symptoms of rash and itching x 3 weeks. Pt is homeless. Tearful at triage as he has a friend that also is experiencing symptoms but will not come to seek medical attention.

## 2021-11-17 NOTE — ED PROVIDER NOTES
"ED Provider Note    CHIEF COMPLAINT  Chief Complaint   Patient presents with   • Itching     reports head to toe itching x 3 weeks    • Rash       HPI  Wilfrid Briceño is a 61 y.o. male here for evaluation of \"itching and scratching.\"  Patient has noted scabies, and is here for treatment.  He has no other medical concerns.  No fever chills vomiting, no chest pain or shortness of breath.  Patient states that he did feel this way for about 3 weeks.  He has a significant other with similar symptoms.  He has no shortness of breath or trouble eating or drinking.    ROS;  Please see HPI  O/W negative     PAST MEDICAL HISTORY   No bleeding disorders    SOCIAL HISTORY  Social History     Tobacco Use   • Smoking status: Current Every Day Smoker     Packs/day: 0.50     Types: Cigarettes   • Smokeless tobacco: Not on file   Substance and Sexual Activity   • Alcohol use: No   • Drug use: No   • Sexual activity: Not on file       SURGICAL HISTORY  patient denies any surgical history    CURRENT MEDICATIONS  Home Medications    **Home medications have not yet been reviewed for this encounter**         ALLERGIES  No Known Allergies    REVIEW OF SYSTEMS  See HPI for further details. Review of systems as above, otherwise all other systems are negative.     PHYSICAL EXAM  VITAL SIGNS: /99   Pulse 93   Temp 36.8 °C (98.3 °F) (Temporal)   Resp 18   Ht 1.778 m (5' 10\")   Wt 66.2 kg (145 lb 15.1 oz)   SpO2 96%   BMI 20.94 kg/m²     Constitutional: Well developed, well nourished. No acute distress.  HEENT: Normocephalic, atraumatic. MMM  Neck: Supple, Full range of motion   Chest/Pulmonary:  No respiratory distress.  Equal expansion   Musculoskeletal: No deformity, no edema, neurovascular intact.   Neuro: Clear speech, appropriate, cooperative, cranial nerves II-XII grossly intact.  Psych: Normal mood and affect  Skin; excoriations noted, no erythema or edema.  Areas consistent with scabies      PROCEDURES     MEDICAL " RECORD  I have reviewed patient's medical record and pertinent results are listed.    COURSE & MEDICAL DECISION MAKING  I have reviewed any medical record information, laboratory studies and radiographic results as noted above.    The patient has been given ivermectin here at the suggestion of pharmacy.  I also wrote him an additional prescription of ivermectin to take again in 2 weeks.  He will return here for any further issues or concerns.    I you have had any blood pressure issues while here in the emergency department, please see your doctor for a further evaluation or work up.    Differential diagnoses include but not limited to: Scabies    This patient presents with scabies.  At this time, I have counseled the patient/family regarding their medications, pain control, and follow up.  They will continue their medications, if any, as prescribed.  They will return immediately for any worsening symptoms and/or any other medical concerns.  They will see their doctor, or contact the doctor provided, in 1-2 days for follow up.       FINAL IMPRESSION  Scabies      Electronically signed by: Dmitriy Browning D.O., 11/17/2021 2:52 PM

## 2021-11-18 NOTE — ED NOTES
Pt medicated as ordered. Pt states understanding of dc instructions and f/u care. Pt given directions to [facility]. Pt ambulatory upstairs.
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
## 2025-07-23 ENCOUNTER — APPOINTMENT (OUTPATIENT)
Dept: RADIOLOGY | Facility: MEDICAL CENTER | Age: 65
End: 2025-07-23
Attending: EMERGENCY MEDICINE
Payer: OTHER MISCELLANEOUS

## 2025-07-23 ENCOUNTER — APPOINTMENT (OUTPATIENT)
Dept: RADIOLOGY | Facility: MEDICAL CENTER | Age: 65
DRG: 957 | End: 2025-07-23
Attending: EMERGENCY MEDICINE
Payer: COMMERCIAL

## 2025-07-23 ENCOUNTER — APPOINTMENT (OUTPATIENT)
Dept: RADIOLOGY | Facility: MEDICAL CENTER | Age: 65
DRG: 957 | End: 2025-07-23
Attending: ORTHOPAEDIC SURGERY
Payer: COMMERCIAL

## 2025-07-23 ENCOUNTER — HOSPITAL ENCOUNTER (INPATIENT)
Facility: MEDICAL CENTER | Age: 65
End: 2025-07-23
Attending: EMERGENCY MEDICINE | Admitting: STUDENT IN AN ORGANIZED HEALTH CARE EDUCATION/TRAINING PROGRAM
Payer: OTHER MISCELLANEOUS

## 2025-07-23 ENCOUNTER — APPOINTMENT (OUTPATIENT)
Dept: RADIOLOGY | Facility: MEDICAL CENTER | Age: 65
DRG: 957 | End: 2025-07-23
Attending: STUDENT IN AN ORGANIZED HEALTH CARE EDUCATION/TRAINING PROGRAM
Payer: COMMERCIAL

## 2025-07-23 ENCOUNTER — ANESTHESIA (OUTPATIENT)
Dept: SURGERY | Facility: MEDICAL CENTER | Age: 65
End: 2025-07-23
Payer: OTHER MISCELLANEOUS

## 2025-07-23 ENCOUNTER — ANESTHESIA EVENT (OUTPATIENT)
Dept: SURGERY | Facility: MEDICAL CENTER | Age: 65
End: 2025-07-23
Payer: OTHER MISCELLANEOUS

## 2025-07-23 ENCOUNTER — APPOINTMENT (OUTPATIENT)
Dept: CARDIOLOGY | Facility: MEDICAL CENTER | Age: 65
DRG: 957 | End: 2025-07-23
Attending: STUDENT IN AN ORGANIZED HEALTH CARE EDUCATION/TRAINING PROGRAM
Payer: COMMERCIAL

## 2025-07-23 PROBLEM — S82.401B OPEN FRACTURE OF RIGHT TIBIA AND FIBULA: Status: ACTIVE | Noted: 2025-07-23

## 2025-07-23 PROBLEM — Z53.09 CONTRAINDICATION TO DEEP VEIN THROMBOSIS (DVT) PROPHYLAXIS: Status: ACTIVE | Noted: 2025-07-23

## 2025-07-23 PROBLEM — J96.90 RESPIRATORY FAILURE FOLLOWING TRAUMA (HCC): Status: ACTIVE | Noted: 2025-07-23

## 2025-07-23 PROBLEM — S22.42XA CLOSED FRACTURE OF MULTIPLE RIBS OF LEFT SIDE: Status: ACTIVE | Noted: 2025-07-23

## 2025-07-23 PROBLEM — S12.9XXA CLOSED FRACTURE OF TRANSVERSE PROCESS OF CERVICAL VERTEBRA (HCC): Status: ACTIVE | Noted: 2025-07-23

## 2025-07-23 PROBLEM — S37.002A INJURY OF LEFT KIDNEY: Status: ACTIVE | Noted: 2025-07-23

## 2025-07-23 PROBLEM — S82.832A CLOSED FRACTURE OF DISTAL END OF LEFT FIBULA: Status: ACTIVE | Noted: 2025-07-23

## 2025-07-23 PROBLEM — J93.9 PNEUMOTHORAX, LEFT: Status: ACTIVE | Noted: 2025-07-23

## 2025-07-23 PROBLEM — T14.90XA TRAUMA: Status: ACTIVE | Noted: 2025-07-23

## 2025-07-23 PROBLEM — S27.0XXA TRAUMATIC PNEUMOTHORAX: Status: ACTIVE | Noted: 2025-07-23

## 2025-07-23 PROBLEM — S82.201B OPEN FRACTURE OF RIGHT TIBIA AND FIBULA: Status: ACTIVE | Noted: 2025-07-23

## 2025-07-23 PROBLEM — S22.43XA MULTIPLE FRACTURES OF RIBS, BILATERAL, INITIAL ENCOUNTER FOR CLOSED FRACTURE: Status: ACTIVE | Noted: 2025-07-23

## 2025-07-23 PROBLEM — J93.9 BILATERAL PNEUMOTHORACES: Status: ACTIVE | Noted: 2025-07-23

## 2025-07-23 PROBLEM — S39.91XA ABDOMINAL INJURY: Status: ACTIVE | Noted: 2025-07-23

## 2025-07-23 PROBLEM — S02.2XXA NASAL BONE FRACTURE: Status: ACTIVE | Noted: 2025-07-23

## 2025-07-23 PROBLEM — E27.40 ADRENAL INSUFFICIENCY (HCC): Status: ACTIVE | Noted: 2025-07-23

## 2025-07-23 PROCEDURE — 93308 TTE F-UP OR LMTD: CPT

## 2025-07-23 PROCEDURE — 700101 HCHG RX REV CODE 250: Performed by: STUDENT IN AN ORGANIZED HEALTH CARE EDUCATION/TRAINING PROGRAM

## 2025-07-23 PROCEDURE — 700105 HCHG RX REV CODE 258: Performed by: STUDENT IN AN ORGANIZED HEALTH CARE EDUCATION/TRAINING PROGRAM

## 2025-07-23 PROCEDURE — 71260 CT THORAX DX C+: CPT

## 2025-07-23 PROCEDURE — 72128 CT CHEST SPINE W/O DYE: CPT

## 2025-07-23 PROCEDURE — 71045 X-RAY EXAM CHEST 1 VIEW: CPT

## 2025-07-23 PROCEDURE — 72131 CT LUMBAR SPINE W/O DYE: CPT

## 2025-07-23 PROCEDURE — 73590 X-RAY EXAM OF LOWER LEG: CPT | Mod: RT

## 2025-07-23 PROCEDURE — 70450 CT HEAD/BRAIN W/O DYE: CPT

## 2025-07-23 PROCEDURE — 93308 TTE F-UP OR LMTD: CPT | Mod: 26 | Performed by: INTERNAL MEDICINE

## 2025-07-23 PROCEDURE — 76705 ECHO EXAM OF ABDOMEN: CPT

## 2025-07-23 PROCEDURE — 72170 X-RAY EXAM OF PELVIS: CPT

## 2025-07-23 PROCEDURE — 700111 HCHG RX REV CODE 636 W/ 250 OVERRIDE (IP): Performed by: STUDENT IN AN ORGANIZED HEALTH CARE EDUCATION/TRAINING PROGRAM

## 2025-07-23 PROCEDURE — 72125 CT NECK SPINE W/O DYE: CPT

## 2025-07-23 PROCEDURE — 73706 CT ANGIO LWR EXTR W/O&W/DYE: CPT | Mod: RT

## 2025-07-23 RX ORDER — CEFAZOLIN SODIUM 1 G/3ML
INJECTION, POWDER, FOR SOLUTION INTRAMUSCULAR; INTRAVENOUS PRN
Status: DISCONTINUED | OUTPATIENT
Start: 2025-07-23 | End: 2025-07-23 | Stop reason: SURG

## 2025-07-23 RX ORDER — PHENYLEPHRINE HYDROCHLORIDE 10 MG/ML
INJECTION, SOLUTION INTRAMUSCULAR; INTRAVENOUS; SUBCUTANEOUS PRN
Status: DISCONTINUED | OUTPATIENT
Start: 2025-07-23 | End: 2025-07-23 | Stop reason: SURG

## 2025-07-23 RX ORDER — SODIUM CHLORIDE, SODIUM GLUCONATE, SODIUM ACETATE, POTASSIUM CHLORIDE AND MAGNESIUM CHLORIDE 526; 502; 368; 37; 30 MG/100ML; MG/100ML; MG/100ML; MG/100ML; MG/100ML
INJECTION, SOLUTION INTRAVENOUS
Status: DISCONTINUED | OUTPATIENT
Start: 2025-07-23 | End: 2025-07-23 | Stop reason: SURG

## 2025-07-23 RX ADMIN — SODIUM CHLORIDE, SODIUM GLUCONATE, SODIUM ACETATE, POTASSIUM CHLORIDE AND MAGNESIUM CHLORIDE: 526; 502; 368; 37; 30 INJECTION, SOLUTION INTRAVENOUS at 12:40

## 2025-07-23 RX ADMIN — ROCURONIUM BROMIDE 30 MG: 10 INJECTION INTRAVENOUS at 11:03

## 2025-07-23 RX ADMIN — PHENYLEPHRINE HYDROCHLORIDE 50 MCG/MIN: 10 INJECTION INTRAVENOUS at 11:14

## 2025-07-23 RX ADMIN — PHENYLEPHRINE HYDROCHLORIDE 150 MCG: 10 INJECTION INTRAVENOUS at 14:37

## 2025-07-23 RX ADMIN — SODIUM CHLORIDE, SODIUM GLUCONATE, SODIUM ACETATE, POTASSIUM CHLORIDE AND MAGNESIUM CHLORIDE: 526; 502; 368; 37; 30 INJECTION, SOLUTION INTRAVENOUS at 11:30

## 2025-07-23 RX ADMIN — SODIUM CHLORIDE, SODIUM GLUCONATE, SODIUM ACETATE, POTASSIUM CHLORIDE AND MAGNESIUM CHLORIDE: 526; 502; 368; 37; 30 INJECTION, SOLUTION INTRAVENOUS at 14:17

## 2025-07-23 RX ADMIN — FENTANYL CITRATE 50 MCG: 50 INJECTION, SOLUTION INTRAMUSCULAR; INTRAVENOUS at 14:17

## 2025-07-23 RX ADMIN — FENTANYL CITRATE 50 MCG: 50 INJECTION, SOLUTION INTRAMUSCULAR; INTRAVENOUS at 14:30

## 2025-07-23 RX ADMIN — CEFAZOLIN 2 G: 1 INJECTION, POWDER, FOR SOLUTION INTRAMUSCULAR; INTRAVENOUS at 11:00

## 2025-07-23 ASSESSMENT — FIBROSIS 4 INDEX: FIB4 SCORE: 3.62

## 2025-07-23 ASSESSMENT — PAIN DESCRIPTION - PAIN TYPE
TYPE: ACUTE PAIN
TYPE: ACUTE PAIN
TYPE: ACUTE PAIN;SURGICAL PAIN
TYPE: ACUTE PAIN
TYPE: ACUTE PAIN;SURGICAL PAIN
TYPE: ACUTE PAIN

## 2025-07-23 ASSESSMENT — LIFESTYLE VARIABLES: REASON UNABLE TO ASSESS: INTUBATED

## 2025-07-23 NOTE — OR NURSING
Pt to pre op desk with ICU staff. anesthesia at bedside. Jn at bedside to wisam. Dr Ragsdale to operate after Dr Jensen if pt stable. Armband verified by 2 RN's. Allergies unknown. Pt back to OR emergently with OR staff.

## 2025-07-23 NOTE — PROGRESS NOTES
4 Eyes Skin Assessment Completed by ***, RN and ***, RN.    Skin assessment is primarily focused on high risk bony prominences. Pay special attention to skin beneath and around medical devices, high risk bony prominences, skin to skin areas and areas where the patient lacks sensation to feel pain and areas where the patient previously had breakdown.     Head (Occipital):  {Eyes:30298646}   Ears (Under Medical Devices): {Ears:49595516}   Nose (Under Medical Devices): {Nose:57502508}   Mouth:  {Mouth:30382035}   Neck: {Neck:53285628}   Breast/Chest:  {Breast/Chest:47456801}   Shoulder Blades:  {Shoulder Blades:23608906}   Spine:   {Spine:13430643}   (R) Arm/Elbow/Hand: {Arm/Elbow/Hand:01195034}   (L) Arm/Elbow/Hand: {Arm/Elbow/Hand:70733746}   Abdomen: {Abdomen:56364145}   Pannus/Groin:  {Pannus/Groin:48179797}   Sacrum/Coccyx:   {Sacrum/Coccyx:80139994}   (R) Ischial Tuberosity (Sit Bones):  {Sit Bones:64807044}   (L) Ischial Tuberosity (Sit Bones):  {Sit Bones:62674329}   (R) Leg:  {Le}   (L) Leg:  {Le}   (R) Heel:  {Heel:37832613}   (R) Foot/Toe: {Foot/Toe:43457761}   (L) Heel: {Heel:88533502}   (L) Foot/Toe:  {Foot/Toe:98070267}       DEVICES IN USE:   Respiratory Devices:  {Respiratory Devices:73520819}  Feeding Devices:  {Feeding Devices:66759187}   Lines & BP Monitoring Devices:  {Lines and BP Monitoring Devices:57225697}    Orthopedic Devices:  {Orthopedic Devices:84037462}  Miscellaneous Devices:  {Miscellaneous Devices:64937050}    PROTOCOL INTERVENTIONS:   {Wound Care Interventions:21901329}    WOUND PHOTOS:   {Wound Photo:37283783}    WOUND CONSULT:   {Wound Consult:80669918}

## 2025-07-23 NOTE — ED NOTES
Pharmacy Medication Reconciliation    ~Unable to complete Medication Reconciliation at this time. Patient is not able to participate in interview.   ~No pharmacy on file and no family at bedside.

## 2025-07-23 NOTE — ASSESSMENT & PLAN NOTE
Markedly comminuted fracture of the metadiaphyseal region of the right proximal tibia was slight volar angulation.  Oblique fracture of distal diaphyseal region of right tibia with slight volar angulation.  Slightly comminuted mid shaft right fibular fracture with slight volar angulation.  CTA with no evidence of acute arterial injury.   Splinted in Emergency Department.  7/23 IM nail.  Weight bearing status - Nonweightbearing RLE.  Galileo Ragsdale MD. Orthopedic Surgeon. [facility].
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
## 2025-07-23 NOTE — ASSESSMENT & PLAN NOTE
Intubated in Emergency Department  Continue full mechanical ventilatory support. Ventilator bundle and Trauma weaning protocol.  7/24 Extubated to oxymask.   7/25 Re-intubated for hypoventilation and hypoxia  7/30 Extubated   Wean oxygen as tolerated.

## 2025-07-23 NOTE — ASSESSMENT & PLAN NOTE
CT showing bilateral rib fractures of the involving the first through fourth ribs. Additional left sided rib fractures of fifth through ninth ribs. Flail segment from ribs six - eight.   Aggressive multimodal pain management and pulmonary hygiene. Serial chest radiographs.

## 2025-07-23 NOTE — ANESTHESIA PREPROCEDURE EVALUATION
Case: [id number] Anesthesia Start Date/Time: 07/23/25 1057    Procedure: LAPAROTOMY, EXPLORATORY (Right)    Location: [facility] OR 01 / SURGERY [facility]    Surgeons: Holly Jensen M.D.            Relevant Problems      (positive) Injury of left kidney       Physical Exam    Airway   Patient is intubated/trached     Cardiovascular    Dental    Pulmonary    Abdominal    Neurological                Anesthesia Plan    ASA 2- EMERGENT   ASA physical status emergent criteria: acute deteriorating condition due to infection, acute ischemia (limb, body part, tissue) and cardiac compromise requiring immediate intervention    Plan - general       Airway plan will be ETT          Induction: inhalational    Postoperative Plan: Postoperative administration of opioids is intended.    Pertinent diagnostic labs and testing reviewed    Informed Consent:  Emergent - Consent given by clinician

## 2025-07-23 NOTE — ASSESSMENT & PLAN NOTE
Extensive pneumoperitoneum suspicious for perforation of a hollow viscus on CT  7/23 Ex lap without intraabdominal injury

## 2025-07-23 NOTE — CARE PLAN
Problem: Knowledge Deficit - Standard  Goal: Patient and family/care givers will demonstrate understanding of plan of care, disease process/condition, diagnostic tests and medications  7/23/2025 1637 by Pepper Camara R.N.  Outcome: Progressing  7/23/2025 1636 by Pepper Camara R.N.  Outcome: Progressing     Problem: Skin Integrity  Goal: Skin integrity is maintained or improved  7/23/2025 1637 by Pepper Camara R.N.  Outcome: Progressing  7/23/2025 1636 by Pepper Camara R.N.  Outcome: Progressing     Problem: Fall Risk  Goal: Patient will remain free from falls  7/23/2025 1637 by Pepper Camara R.N.  Outcome: Progressing  7/23/2025 1636 by Pepper Camara R.N.  Outcome: Progressing     Problem: Pain - Standard  Goal: Alleviation of pain or a reduction in pain to the patient’s comfort goal  Outcome: Progressing     Problem: Safety - Medical Restraint  Goal: Remains free of injury from restraints (Restraint for Interference with Medical Device)  Outcome: Progressing  Goal: Free from restraint(s) (Restraint for Interference with Medical Device)  Outcome: Progressing   The patient is Watcher - Medium risk of patient condition declining or worsening    Shift Goals  Clinical Goals: hemodynamic stability, OR  Patient Goals: sher  Family Goals: not family present    Progress made toward(s) clinical / shift goals:  All of the above    Patient is not progressing towards the following goals:

## 2025-07-23 NOTE — ANESTHESIA PROCEDURE NOTES
Arterial Line    Performed by: Betito Aranda M.D.  Authorized by: Betito Aranda M.D.    Start Time:  7/23/2025 11:03 AM  End Time:  7/23/2025 11:08 AM  Localization: surface landmarks    Patient Location:  OR  Indication: continuous blood pressure monitoring        Catheter Size:  20 G  Seldinger Technique?: Yes    Laterality:  Left  Site:  Radial artery  Line Secured:  Antimicrobial disc, tape and transparent dressing  Events: patient tolerated procedure well with no complications    Other::  2 attempts

## 2025-07-23 NOTE — ASSESSMENT & PLAN NOTE
Nondisplaced left-sided nasal bone fracture. Extensive subcutaneous emphysema about the skull base and involving the temporalis fossa and  spaces bilaterally as well as the parapharyngeal spaces. Left-sided pneumoorbita  Non-operative management.  Yony Wilkes MD. [facility].

## 2025-07-23 NOTE — OR NURSING
Pt was transported to OR from Pre op vented, procedure was deemed emergent by surgeons. No allergies on file, no family contacts noted. Pt came down with no possessions. Restraints were on. Restraints put back on before returning to the floor

## 2025-07-23 NOTE — ASSESSMENT & PLAN NOTE
CT showing intraparenchymal hematoma involving the lateral convexity of the left kidney. Grade 3 left renal injury. Wedge-shaped areas of decreased attenuation involving the left upper pole and left lower pole posteriorly consistent with renal infarcts/contusions.   Avoid nephrotoxins.   Serial hemograms

## 2025-07-23 NOTE — ANESTHESIA POSTPROCEDURE EVALUATION
Patient: Ramesh DietrichSix    Procedure Summary       Date: 07/23/25 Room / Location: [facility] OR 01 / SURGERY [facility]    Anesthesia Start: 1057 Anesthesia Stop: 1500    Procedures:       LAPAROTOMY, EXPLORATORY (Right: Abdomen)      DEBRIDEMENT AND INTRAMEDULLARY NAIL FIXATION OF OPEN RIGHT TIBIAL SHAFT AND PROXIMAL TIBIA FRACTURES (Right: Leg Lower) Diagnosis: (TYPE II OPEN SEGMENTAL TIBIA SHAFT FRACTURE)    Surgeons: Holly Jensen M.D.; Galileo Ragsdale M.D. Responsible Provider: Betito Aranda M.D.    Anesthesia Type: general ASA Status: 2 - Emergent            Final Anesthesia Type: general  Last vitals  BP   BP: 118/72    Temp   35.3 °C (95.6 °F)    Pulse   87   Resp   13    SpO2   100 %      Anesthesia Post Evaluation    Patient location during evaluation: ICU  Patient participation: complete - patient cannot participate    Anesthetic complications: no  Cardiovascular status: adequate  Respiratory status: ETT and ventilator  Hydration status: acceptable              There were no known notable events for this encounter.
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
## 2025-07-23 NOTE — PROGRESS NOTES
Dr. Horn at bedside with this  RN to perform L chest tube placement.    All necessary life support equipment in the room.     Time Out 0913. All in agreement.     Pt identified with 2 patient identifiers.    Procedure start time: 0914  Procedure end time: 0919     Medications given per MD order, please see MAR.

## 2025-07-23 NOTE — ASSESSMENT & PLAN NOTE
VTE prophylaxis initially contraindicated secondary to elevated bleeding risk.  Prophylactic dose enoxaparin 40 mg BID initiated upon admission.

## 2025-07-23 NOTE — ASSESSMENT & PLAN NOTE
Left C7 nondisplaced transverse process fracture  CTA neck without vascular injury.  Non-operative management.   Cervical immobilization.  Dionicio Montejo MD. Orthopedic Surgeon. [facility].

## 2025-07-23 NOTE — PROGRESS NOTES
Left Smith C distal fibula fracture discovered while in OR under fluoro   - Nondisplaced  - Nonoperative management  - WBAT LLE in cam boot; boot order placed

## 2025-07-23 NOTE — PROGRESS NOTES
I was paged at [phone] to consult on this patient. I arrived at the patient's bedside at 0894.  - Right type II open segmental tibia shaft fracture  - Bleeding controlled  - Reduced and immobilized by ERP in trauma bay  - Plan for OR later today with Dr. Ragsdale for intramedullary nailing of right tibia  - Dr. Ragsdale notified [phone]

## 2025-07-23 NOTE — ASSESSMENT & PLAN NOTE
Small right pneumothorax, extensive pneumomediastinum and subcutaneous emphysema.   Needle decompression prior to arrival.  28F Right tube thoracostomy on admit.  7/25 Persistence of pneumothorax and SC emphysema, suspect CT may be in fissure, second 24F chest tube placed apically, large airleak noted.  7/27 Recurrence of pneumothorax, apical chest tube with less airleak, CT chest - moderate hydropneumothorax, 10F pigtail chest tube placed anteriorly  7/30 Air leak in chest tube #1 and 2, extubated to remove positive pressure ventilation   7/31 CT Chest pending  Aggressive pulmonary hygiene and serial chest radiography.

## 2025-07-23 NOTE — ANESTHESIA TIME REPORT
Anesthesia Start and Stop Event Times       Date Time Event    7/23/2025 1057 Anesthesia Start     1114 Ready for Procedure     1500 Anesthesia Stop          Responsible Staff  07/23/25      Name Role Begin End    Betito rAanda M.D. Anesth 1057 1500          Overtime Reason:  no overtime (within assigned shift)    Comments:
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
## 2025-07-23 NOTE — PROCEDURES
DATE OF PROCEDURE: 7/23/2025     PRE-PROCEDURE DIAGNOSES: Left sided pneumothorax    POST-PROCEDURE DIAGNOSES: Same     PROCEDURE PERFORMED:   Left tube thoracostomy .     PERFORMED BY: Holly Jensen M.D.      FINDINGS: Moderate-sized pneumothorax seen on CT.  Given the plans for operative intervention and positive pressure ventilation, a chest tube was placed    PROCEDURE: The patient was properly identified and optimally positioned with the arm restrained and padded above the patient's head. Continuous hemodynamic and oxygen saturation monitoring was employed through out the procedure.  Moderate intravenous sedation was administered. The left chest wall was widely prepped and draped into a sterile field.     Local anesthetic was used to infiltrate the chest tube site.  A 3-cm transverse incision was made in the mid-axillary line and the subcutaneous tissue spread bluntly. The thoracic cavity was accessed bluntly over the rib.  A finger was placed through the tract confirming intra-thoracic entry.  A 28 Fr chest tube was directed toward the chest apex and secured to the skin with an 0-Ethibond suture.     The chest tube was connected to closed suction drainage and a sterile chest tube dressing was applied. The patient tolerated the procedure well. There were no apparent complications.  A post-procedural chest x-ray was ordered and showed improvement in the pre-procedural chest x-ray findings.  ____________________________________   Holly Jensen M.D.     DD: 7/23/2025  12:55 PM

## 2025-07-23 NOTE — ASSESSMENT & PLAN NOTE
CT showing bilateral rib fractures of the involving the first through fourth ribs.   Additional left sided rib fractures of fifth through ninth ribs.   Flail segment from ribs six - eight.   Fracture pattern not amenable to fixation   Aggressive multimodal pain management and pulmonary hygiene. Serial chest radiographs

## 2025-07-24 ENCOUNTER — APPOINTMENT (OUTPATIENT)
Dept: RADIOLOGY | Facility: MEDICAL CENTER | Age: 65
End: 2025-07-24
Attending: NURSE PRACTITIONER
Payer: OTHER MISCELLANEOUS

## 2025-07-24 ENCOUNTER — APPOINTMENT (OUTPATIENT)
Dept: RADIOLOGY | Facility: MEDICAL CENTER | Age: 65
DRG: 957 | End: 2025-07-24
Attending: STUDENT IN AN ORGANIZED HEALTH CARE EDUCATION/TRAINING PROGRAM
Payer: COMMERCIAL

## 2025-07-24 ENCOUNTER — APPOINTMENT (OUTPATIENT)
Dept: RADIOLOGY | Facility: MEDICAL CENTER | Age: 65
DRG: 957 | End: 2025-07-24
Attending: REGISTERED NURSE
Payer: COMMERCIAL

## 2025-07-24 PROBLEM — Z78.9 NO CONTRAINDICATION TO DEEP VEIN THROMBOSIS (DVT) PROPHYLAXIS: Status: ACTIVE | Noted: 2025-07-23

## 2025-07-24 PROCEDURE — 70498 CT ANGIOGRAPHY NECK: CPT

## 2025-07-24 PROCEDURE — 70486 CT MAXILLOFACIAL W/O DYE: CPT

## 2025-07-24 ASSESSMENT — PAIN DESCRIPTION - PAIN TYPE
TYPE: SURGICAL PAIN
TYPE: ACUTE PAIN;CHRONIC PAIN;SURGICAL PAIN
TYPE: ACUTE PAIN
TYPE: SURGICAL PAIN
TYPE: ACUTE PAIN
TYPE: SURGICAL PAIN
TYPE: ACUTE PAIN;CHRONIC PAIN;SURGICAL PAIN
TYPE: ACUTE PAIN;CHRONIC PAIN;SURGICAL PAIN
TYPE: ACUTE PAIN
TYPE: SURGICAL PAIN
TYPE: ACUTE PAIN;SURGICAL PAIN
TYPE: ACUTE PAIN
TYPE: ACUTE PAIN

## 2025-07-24 ASSESSMENT — FIBROSIS 4 INDEX: FIB4 SCORE: 6.16

## 2025-07-24 ASSESSMENT — PULMONARY FUNCTION TESTS: FVC: 956

## 2025-07-24 ASSESSMENT — COPD QUESTIONNAIRES
DURING THE PAST 4 WEEKS HOW MUCH DID YOU FEEL SHORT OF BREATH: NONE/LITTLE OF THE TIME
DO YOU EVER COUGH UP ANY MUCUS OR PHLEGM?: NO/ONLY WITH OCCASIONAL COLDS OR INFECTIONS
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
## 2025-07-24 NOTE — PROGRESS NOTES
2200 Notified SAUMYA Oconnor of hypertension despite sedation and pain medication per MAR. Orders received. Discussed lab results and received orders for trending CPK.    2323 Notified APRN of critical CPK [id number].
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
## 2025-07-24 NOTE — CARE PLAN
The patient is Watcher - Medium risk of patient condition declining or worsening    Shift Goals  Clinical Goals: Hemodynamic stability, Q6 labs  Patient Goals: DYALN  Family Goals: DYLAN    Progress made toward(s) clinical / shift goals:    Problem: Knowledge Deficit - Standard  Goal: Patient and family/care givers will demonstrate understanding of plan of care, disease process/condition, diagnostic tests and medications  Outcome: Not Progressing     Problem: Pain - Standard  Goal: Alleviation of pain or a reduction in pain to the patient’s comfort goal  Outcome: Progressing     Problem: Safety - Medical Restraint  Goal: Remains free of injury from restraints (Restraint for Interference with Medical Device)  Outcome: Progressing       Patient is not progressing towards the following goals:      Problem: Knowledge Deficit - Standard  Goal: Patient and family/care givers will demonstrate understanding of plan of care, disease process/condition, diagnostic tests and medications  Outcome: Not Progressing

## 2025-07-24 NOTE — PROGRESS NOTES
"    Orthopedic PA Progress Note    Interval changes:  Patient doing well postop. Still intubated  Ok for Q4 pulse checks  RLE dressings are CDI  LLE boot to remain in place  NWB RLE  WBAT LLE  Will keep off right lower due to comminution and intra-articular component of tibial plateau  No pending ortho procedures    ROS - Pain appears controlled.    BP (!) 170/93   Pulse 80   Temp 35.3 °C (95.6 °F)   Resp 20   Ht 1.803 m (5' 10.98\")   Wt 71 kg (156 lb 8.4 oz)   SpO2 97%     Patient seen and examined  Intubated  Opens eyes spontaneously  RRR  RLE: Dressings CDI. DF/PF intact. Flexes and extends toes. Sensation appears intact. DP pulse 2+. Pulses heard on doppler     Recent Labs     07/23/25  1515 07/23/25  1942 07/24/25  0058 07/24/25  0519   WBC 11.2* 12.5* 13.8*  --    RBC 3.47* 3.78* 3.95*  --    HEMOGLOBIN 10.9* 11.9* 12.0* 11.0*   HEMATOCRIT 33.3* 34.9* 36.2* 33.1*   MCV 96.0 92.3 91.6  --    MCH 31.4 31.5 30.4  --    MCHC 32.7 34.1 33.1  --    RDW 47.0 44.7 44.4  --    PLATELETCT 188 204 219  --    MPV 10.1 9.9 10.2  --        Active Hospital Problems    Diagnosis     Pneumothorax, right [J93.9]      Priority: High    Respiratory failure following trauma (HCC) [J96.90]      Priority: High    Injury of left kidney [S37.002A]      Priority: High    Multiple fractures of ribs, bilateral, initial encounter for closed fracture [S22.43XA]      Priority: High    Pneumothorax, left [J93.9]      Priority: High    Open fracture of right tibia and fibula [S82.201B, S82.401B]      Priority: Medium    Closed fracture of transverse process of cervical vertebra (HCC) [S12.9XXA]      Priority: Medium    Nasal bone fracture [S02.2XXA]      Priority: Medium    Abdominal injury [S39.91XA]      Priority: Medium    Contraindication to deep vein thrombosis (DVT) prophylaxis [Z53.09]      Priority: Medium    Adrenal insufficiency (HCC) [E27.40]      Priority: Medium    Trauma [T14.90XA]      Priority: Low    Closed fracture of " distal end of left fibula [S82.832A]      Priority: Low       DX-CHEST-PORTABLE (1 VIEW)   Final Result         1.  Interstitial opacities suggests subtle edema and/or infiltrates.   2.  Extensive soft tissue gas in the bilateral chest wall and neck   3.  Bilateral rib fractures      US-TRAUMA VEIN SCREEN LOWER BILAT EXTREMITY   Final Result      EC-ECHOCARDIOGRAM LTD W/O CONT   Final Result      DX-CHEST-PORTABLE (1 VIEW)   Final Result      1.  Extensive subcutaneous emphysema and pneumomediastinum.   2.  Small residual bilateral pneumothoraces.   3.  Multiple bilateral rib fractures in the upper hemithoraces.      DX-PORTABLE FLUORO > 1 HOUR   Final Result      Portable fluoroscopy utilized for 2 minutes 30 seconds.         INTERPRETING LOCATION: [address], [place], [address]      DX-TIBIA AND FIBULA RIGHT   Final Result      Digitized intraoperative radiograph is submitted for review. This examination is not for diagnostic purpose but for guidance during a surgical procedure. Please see the patient's chart for full procedural details.         INTERPRETING LOCATION: [address], [place], [address]      DX-CHEST-PORTABLE (1 VIEW)   Final Result      1.  Bilateral chest tubes present. Previously seen pneumothoraces have resolved.      2.  Multiple bilateral rib fractures.      US-ABDOMEN F.A.S.T. LTD (FOR ED USE ONLY)   Final Result      No free fluid seen in all 4 quadrants.      Pneumoperitoneum.            CT-LSPINE W/O PLUS RECONS   Final Result      1.  Mild dextroscoliosis of the lumbar spine.   2.  Moderate discal degenerative changes from the L3-4 level through the L5-S1 level with mild marginal osteophytosis.   3.  No evidence of acute lumbar spine fracture and/or subluxation.   4.  Intraparenchymal hematoma involving the lateral convexity of the left kidney. Grade 3 left renal injury.   5.  Wedge-shaped areas of decreased attenuation involving the left upper pole and left lower pole posteriorly consistent  with renal infarcts/contusions.      CT-TSPINE W/O PLUS RECONS   Final Result      1.  Left C7 nondisplaced transverse process fracture.   2.  Multiple posterior rib fractures from T1 through T4 bilaterally and on the left extending to T6.   3.  Small right pneumothorax and moderate left pneumothorax.   4.  Extensive pneumomediastinum and subcutaneous emphysema.   5.  No evidence for acute fracture or subluxation of the thoracic spine      CT-CTA LOWER EXT WITH & W/O-POST PROCESS RIGHT   Final Result      1.  Comminuted fracture of the metadiaphyseal diaphyseal region of the right proximal tibia.   2.  Oblique fracture of the right distal tibia.   3.  Comminuted fracture of the midshaft of the right fibula in near-anatomic alignment.   4.  No evidence of acute arterial injury in the right lower extremity.         CT-CHEST,ABDOMEN,PELVIS WITH   Final Result      1.  Extensive pneumomediastinum and subcutaneous emphysema throughout the chest and neck.   2.  Small residual right anterior pneumothorax following chest tube placement.   3.  Moderate left pneumothorax.   4.  Multiple bilateral rib fractures as described above.   5.  Extensive pneumoperitoneum suspicious for perforation of a hollow viscus.   6.  Wedge-shaped intraparenchymal hemorrhage involving the lateral aspect of the left kidney consistent with a grade 3 renal injury.   7.  Multiple left renal infarcts/contusions. No evidence of free fluid about the left kidney.      8.  These findings were discussed with LISA PRABHAKAR at 9:40 AM 7/23/2025      CT-CSPINE WITHOUT PLUS RECONS   Final Result      1.  No evidence of cervical spine fracture and/or subluxation.   2.  Moderate osteoarthritic changes at C5-6 and C6-7 levels.   3.  Extensive subcutaneous emphysema and pneumomediastinum.   4.  Multiple upper rib fractures posteriorly and laterally.   5.  Left C7 transverse process fracture nondisplaced.   6.  Left first rib fracture near its costovertebral  junction and also anteriorly.   7.  Bilateral second through fourth rib fractures posteriorly.   8.  Small right pneumothorax and moderate left pneumothorax.      CT-HEAD W/O   Final Result      1.  Nondisplaced left-sided nasal bone fracture.   2.  Extensive subcutaneous emphysema about the skull base and involving the temporalis fossa and  spaces bilaterally as well as the parapharyngeal spaces.   3.  Left-sided pneumoorbita.   4.  No evidence of intracranial hemorrhage or subdural hematoma formation.                  DX-PELVIS-1 OR 2 VIEWS   Final Result      Normal AP view of the pelvis.      DX-CHEST-LIMITED (1 VIEW)   Final Result      1.  Interval increase in size of small to moderate left-sided pneumothorax.   2.  Interval resolution of right-sided pneumothorax following chest tube placement.   3.  Extensive subcutaneous emphysema throughout the base of the neck and chest.   4.  Pneumomediastinum.   5.  Multiple bilateral rib fractures in the upper and mid hemithoraces.      DX-CHEST-LIMITED (1 VIEW)   Final Result      1.  Moderate right-sided pneumothorax and small left-sided pneumothorax.   2.  Extensive subcutaneous emphysema about the chest and base of the neck.   3.  Pneumomediastinum.   4.  Multiple bilateral rib fractures in the upper and mid hemithoraces bilaterally.      DX-TIBIA AND FIBULA RIGHT   Final Result      1.  Markedly comminuted fracture of the metadiaphyseal region of the right proximal tibia was slight volar angulation.      2.  Oblique fracture of distal diaphyseal region of right tibia with slight volar angulation.      3.  Slightly comminuted mid shaft right fibular fracture with slight volar angulation      CT-CTA NECK WITH & W/O-POST PROCESSING    (Results Pending)       Assessment/Plan:  Patient doing well postop. Still intubated  Ok for Q4 pulse checks  RLE dressings are CDI  LLE boot to remain in place  NWB RLE  WBAT LLE  Will keep off right lower due to comminution  and intra-articular component of tibial plateau  No pending ortho procedures    POD#1 S/p  Intramedullary nailing of right segmental tibia shaft fracture  Percutaneous screw fixation tibial plateau  Wt bearing status - NWB RLE. WBAT LLE  Future Procedures - None planned   Wound care/Drains - Dressings to be changed every other day by nursing. Or PRN for saturation starting POD#2  Sutures/Staples out- 14-21 days post operatively. Removal will completed by ortho [facility]'s unless transferred.  Inpatient DVT prophylaxis: Lovenox initiated 7/24  DVT Prophylaxis outpatient: ASA 81 mg PO BID x4 weeks  PT/OT-initiated  Antibiotics:  Perioperative completed  DVT Prophylaxis- TEDS/SCDs/Foot pumps  Case Coordination for Discharge Planning - Disposition per therapy recs.   Follow up with Dr. Ragsdale 2 weeks following final surgery for repeat imaging and wound check. (Est follow up date 8/6)

## 2025-07-24 NOTE — CARE PLAN
The patient is Watcher - Medium risk of patient condition declining or worsening    Shift Goals  Clinical Goals: Hemodynamic stability, Q6 labs, Improved neuro  Patient Goals: DYLAN  Family Goals: DYLAN    Progress made toward(s) clinical / shift goals:    Problem: Knowledge Deficit - Standard  Goal: Patient and family/care givers will demonstrate understanding of plan of care, disease process/condition, diagnostic tests and medications  Outcome: Progressing     Problem: Skin Integrity  Goal: Skin integrity is maintained or improved  Outcome: Progressing     Problem: Fall Risk  Goal: Patient will remain free from falls  Outcome: Progressing     Problem: Pain - Standard  Goal: Alleviation of pain or a reduction in pain to the patient’s comfort goal  Outcome: Progressing     Problem: Safety - Medical Restraint  Goal: Remains free of injury from restraints (Restraint for Interference with Medical Device)  Outcome: Met  Goal: Free from restraint(s) (Restraint for Interference with Medical Device)  Outcome: Met       Patient is not progressing towards the following goals:

## 2025-07-24 NOTE — ASSESSMENT & PLAN NOTE
Moderate left pneumothorax, extensive pneumomediastinum and subcutaneous emphysema.   28F left tube thoracostomy on admit.  Chest tube to suction.  7/29 Chest tube to water seal  7/30 Chest tube removed.   Aggressive pulmonary hygiene and serial chest radiography

## 2025-07-24 NOTE — PROGRESS NOTES
"      Mental status adequate for full examination?: No    Spine cleared (radiologically and/or clinically): No    REVIEW OF SYSTEMS:  Review of Systems   Unable to perform ROS: Intubated       PHYSICAL EXAMINATION:  BP (!) 170/93   Pulse 74   Temp 35.3 °C (95.6 °F)   Resp 20   Ht 1.803 m (5' 10.98\")   Wt 71 kg (156 lb 8.4 oz)   SpO2 100%   BMI 21.84 kg/m²   Physical Exam  Vitals and nursing note reviewed.   Constitutional:       Appearance: Jairo is ill-appearing.      Interventions: Jairo is intubated. Cervical collar in place.   HENT:      Head: Normocephalic.      Comments: Minimal nasal bone swelling.      Nose: Nose normal.      Mouth/Throat:      Mouth: Mucous membranes are moist.      Pharynx: Oropharynx is clear.   Eyes:      Conjunctiva/sclera: Conjunctivae normal.      Pupils: Pupils are equal, round, and reactive to light.   Cardiovascular:      Rate and Rhythm: Normal rate and regular rhythm.      Pulses: Normal pulses.      Heart sounds: No murmur heard.  Pulmonary:      Effort: Pulmonary effort is normal. No respiratory distress. Jairo is intubated.      Breath sounds: Normal breath sounds.   Chest:      Comments: Bilateral chest tubes to wall suction.   Abdominal:      General: Abdomen is flat.      Palpations: Abdomen is soft.      Comments: ML surgical dressing, scant drainage noted.    Musculoskeletal:         General: Normal range of motion.      Cervical back: Neck supple.      Comments: RLE surgical ace wrap/dressing. Clean and dry.   Left CAM boot.    Skin:     General: Skin is warm and dry.      Capillary Refill: Capillary refill takes less than 2 seconds.   Neurological:      General: No focal deficit present.      Mental Status: Jairo is alert and oriented to person, place, and time. Mental status is at baseline.   Psychiatric:         Mood and Affect: Mood normal.         LABORATORY VALUES:  Recent Labs     07/23/25  1515 07/23/25  1942 07/24/25  0058 07/24/25  0519   WBC 11.2* " 12.5* 13.8*  --    RBC 3.47* 3.78* 3.95*  --    HEMOGLOBIN 10.9* 11.9* 12.0* 11.0*   HEMATOCRIT 33.3* 34.9* 36.2* 33.1*   MCV 96.0 92.3 91.6  --    MCH 31.4 31.5 30.4  --    MCHC 32.7 34.1 33.1  --    RDW 47.0 44.7 44.4  --    PLATELETCT 188 204 219  --    MPV 10.1 9.9 10.2  --      Recent Labs     07/23/25  1905 07/24/25  0058 07/24/25  0519   SODIUM 138 136 138   POTASSIUM 5.1 5.6* 5.4   CHLORIDE 109 107 109   CO2 18* 18* 19*   GLUCOSE 182* 163* 142*   BUN 22 25* 27*   CREATININE 1.25 1.47* 1.61*   CALCIUM 7.9* 8.0* 7.8*     Recent Labs     07/23/25  1015 07/23/25  1515 07/24/25  0058   ASTSGOT 51* 78* 135*   ALTSGPT 23 27 41   TBILIRUBIN 0.4 0.2 0.4   ALKPHOSPHAT 66 47 55   GLOBULIN 1.7* 1.1* 1.9   INR 1.22* 1.42*  --      Recent Labs     07/23/25  1015 07/23/25  1515   APTT 28.2 28.6   INR 1.22* 1.42*       IMAGING:  DX-CHEST-PORTABLE (1 VIEW)   Final Result         1.  Interstitial opacities suggests subtle edema and/or infiltrates.   2.  Extensive soft tissue gas in the bilateral chest wall and neck   3.  Bilateral rib fractures      US-TRAUMA VEIN SCREEN LOWER BILAT EXTREMITY   Final Result      EC-ECHOCARDIOGRAM LTD W/O CONT   Final Result      DX-CHEST-PORTABLE (1 VIEW)   Final Result      1.  Extensive subcutaneous emphysema and pneumomediastinum.   2.  Small residual bilateral pneumothoraces.   3.  Multiple bilateral rib fractures in the upper hemithoraces.      DX-PORTABLE FLUORO > 1 HOUR   Final Result      Portable fluoroscopy utilized for 2 minutes 30 seconds.         INTERPRETING LOCATION: [address] [place] [address], [facility]      DX-TIBIA AND FIBULA RIGHT   Final Result      Digitized intraoperative radiograph is submitted for review. This examination is not for diagnostic purpose but for guidance during a surgical procedure. Please see the patient's chart for full procedural details.         INTERPRETING LOCATION: [facility], [place], [address]      DX-CHEST-PORTABLE (1 VIEW)   Final Result      1.   Bilateral chest tubes present. Previously seen pneumothoraces have resolved.      2.  Multiple bilateral rib fractures.      US-ABDOMEN F.A.S.T. LTD (FOR ED USE ONLY)   Final Result      No free fluid seen in all 4 quadrants.      Pneumoperitoneum.            CT-LSPINE W/O PLUS RECONS   Final Result      1.  Mild dextroscoliosis of the lumbar spine.   2.  Moderate discal degenerative changes from the L3-4 level through the L5-S1 level with mild marginal osteophytosis.   3.  No evidence of acute lumbar spine fracture and/or subluxation.   4.  Intraparenchymal hematoma involving the lateral convexity of the left kidney. Grade 3 left renal injury.   5.  Wedge-shaped areas of decreased attenuation involving the left upper pole and left lower pole posteriorly consistent with renal infarcts/contusions.      CT-TSPINE W/O PLUS RECONS   Final Result      1.  Left C7 nondisplaced transverse process fracture.   2.  Multiple posterior rib fractures from T1 through T4 bilaterally and on the left extending to T6.   3.  Small right pneumothorax and moderate left pneumothorax.   4.  Extensive pneumomediastinum and subcutaneous emphysema.   5.  No evidence for acute fracture or subluxation of the thoracic spine      CT-CTA LOWER EXT WITH & W/O-POST PROCESS RIGHT   Final Result      1.  Comminuted fracture of the metadiaphyseal diaphyseal region of the right proximal tibia.   2.  Oblique fracture of the right distal tibia.   3.  Comminuted fracture of the midshaft of the right fibula in near-anatomic alignment.   4.  No evidence of acute arterial injury in the right lower extremity.         CT-CHEST,ABDOMEN,PELVIS WITH   Final Result      1.  Extensive pneumomediastinum and subcutaneous emphysema throughout the chest and neck.   2.  Small residual right anterior pneumothorax following chest tube placement.   3.  Moderate left pneumothorax.   4.  Multiple bilateral rib fractures as described above.   5.  Extensive pneumoperitoneum  suspicious for perforation of a hollow viscus.   6.  Wedge-shaped intraparenchymal hemorrhage involving the lateral aspect of the left kidney consistent with a grade 3 renal injury.   7.  Multiple left renal infarcts/contusions. No evidence of free fluid about the left kidney.      8.  These findings were discussed with LISA PRABHAKAR at 9:40 AM 7/23/2025      CT-CSPINE WITHOUT PLUS RECONS   Final Result      1.  No evidence of cervical spine fracture and/or subluxation.   2.  Moderate osteoarthritic changes at C5-6 and C6-7 levels.   3.  Extensive subcutaneous emphysema and pneumomediastinum.   4.  Multiple upper rib fractures posteriorly and laterally.   5.  Left C7 transverse process fracture nondisplaced.   6.  Left first rib fracture near its costovertebral junction and also anteriorly.   7.  Bilateral second through fourth rib fractures posteriorly.   8.  Small right pneumothorax and moderate left pneumothorax.      CT-HEAD W/O   Final Result      1.  Nondisplaced left-sided nasal bone fracture.   2.  Extensive subcutaneous emphysema about the skull base and involving the temporalis fossa and  spaces bilaterally as well as the parapharyngeal spaces.   3.  Left-sided pneumoorbita.   4.  No evidence of intracranial hemorrhage or subdural hematoma formation.                  DX-PELVIS-1 OR 2 VIEWS   Final Result      Normal AP view of the pelvis.      DX-CHEST-LIMITED (1 VIEW)   Final Result      1.  Interval increase in size of small to moderate left-sided pneumothorax.   2.  Interval resolution of right-sided pneumothorax following chest tube placement.   3.  Extensive subcutaneous emphysema throughout the base of the neck and chest.   4.  Pneumomediastinum.   5.  Multiple bilateral rib fractures in the upper and mid hemithoraces.      DX-CHEST-LIMITED (1 VIEW)   Final Result      1.  Moderate right-sided pneumothorax and small left-sided pneumothorax.   2.  Extensive subcutaneous emphysema about the  chest and base of the neck.   3.  Pneumomediastinum.   4.  Multiple bilateral rib fractures in the upper and mid hemithoraces bilaterally.      DX-TIBIA AND FIBULA RIGHT   Final Result      1.  Markedly comminuted fracture of the metadiaphyseal region of the right proximal tibia was slight volar angulation.      2.  Oblique fracture of distal diaphyseal region of right tibia with slight volar angulation.      3.  Slightly comminuted mid shaft right fibular fracture with slight volar angulation      CT-CTA NECK WITH & W/O-POST PROCESSING    (Results Pending)   CT-MAXILLOFACIAL W/O PLUS RECONS    (Results Pending)       CAGE Results: not completed Blood Alcohol>0.08: no       PDI Score: Not completed  (Score > 23 = Psychiatry consult)    All current laboratory studies/radiology exams reviewed: Yes    Medications reconciliation has been reviewed: Yes    Completed Consultations:  Maxillofacial  Orthopedic surgery     Pending Consultations:  None.    Newly identified diagnoses, injuries and/or co-morbidities:  None.    Discussed patient condition with , , trauma surgery, and ICU rounds. Gagandeep Ty MD
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
## 2025-07-24 NOTE — PROGRESS NOTES
Called to bedside by RN for concerns of pulseless feet bilaterally   - After some searching, bilateral pedal pulses found by myself and Dr. Jensen  - Right lower leg compartments soft and compressible; leg unwrapped and rewrapped  No new orders at this time; continue to monitor Q1 overnight     Called: 1725  At bedside: 1735  Dr. Ragsdale aware of events

## 2025-07-24 NOTE — DISCHARGE PLANNING
[facility] Search finds Pt's sister, Kika Call of [place] [phone]    Call to [facility] returned no family contact and that Pt has not stayed there since 2024
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
## 2025-07-24 NOTE — CARE PLAN
Ventilator Daily Summary    Vent Day #1  Airway: 8@25    Ventilator settings: 20 480 +8 30%  Weaning trials: none  Respiratory Procedures: intubated    Plan: Continue current ventilator settings and wean mechanical ventilation as tolerated per physician orders.  Problem: Ventilation  Goal: Ability to achieve and maintain unassisted ventilation or tolerate decreased levels of ventilator support  Description: Target End Date:  4 days     Document on Vent flowsheet    1.  Support and monitor invasive and noninvasive mechanical ventilation  2.  Monitor ventilator weaning response  3.  Perform ventilator associated pneumonia prevention interventions  4.  Manage ventilation therapy by monitoring diagnostic test results  Outcome: Progressing

## 2025-07-24 NOTE — PROGRESS NOTES
Spine consulted via [facility] at 1929.  Response received by [facility] at 1935.     Linsey Wegener, APRN

## 2025-07-25 ENCOUNTER — APPOINTMENT (OUTPATIENT)
Dept: RADIOLOGY | Facility: MEDICAL CENTER | Age: 65
DRG: 957 | End: 2025-07-25
Attending: SURGERY
Payer: COMMERCIAL

## 2025-07-25 ENCOUNTER — APPOINTMENT (OUTPATIENT)
Dept: RADIOLOGY | Facility: MEDICAL CENTER | Age: 65
DRG: 957 | End: 2025-07-25
Payer: COMMERCIAL

## 2025-07-25 ENCOUNTER — APPOINTMENT (OUTPATIENT)
Dept: RADIOLOGY | Facility: MEDICAL CENTER | Age: 65
End: 2025-07-25
Payer: OTHER MISCELLANEOUS

## 2025-07-25 ENCOUNTER — APPOINTMENT (OUTPATIENT)
Dept: RADIOLOGY | Facility: MEDICAL CENTER | Age: 65
DRG: 957 | End: 2025-07-25
Attending: PHYSICIAN ASSISTANT
Payer: COMMERCIAL

## 2025-07-25 PROBLEM — K66.8 PNEUMOPERITONEUM: Status: ACTIVE | Noted: 2025-07-23

## 2025-07-25 PROCEDURE — 71045 X-RAY EXAM CHEST 1 VIEW: CPT

## 2025-07-25 ASSESSMENT — FIBROSIS 4 INDEX: FIB4 SCORE: 12.6

## 2025-07-25 ASSESSMENT — PAIN DESCRIPTION - PAIN TYPE
TYPE: ACUTE PAIN
TYPE: ACUTE PAIN
TYPE: ACUTE PAIN;CHRONIC PAIN;SURGICAL PAIN
TYPE: ACUTE PAIN
TYPE: ACUTE PAIN;CHRONIC PAIN;SURGICAL PAIN
TYPE: ACUTE PAIN;CHRONIC PAIN;SURGICAL PAIN
TYPE: ACUTE PAIN
TYPE: ACUTE PAIN;CHRONIC PAIN;SURGICAL PAIN
TYPE: ACUTE PAIN
TYPE: ACUTE PAIN
TYPE: ACUTE PAIN;CHRONIC PAIN;SURGICAL PAIN
TYPE: ACUTE PAIN;CHRONIC PAIN;SURGICAL PAIN
TYPE: ACUTE PAIN
TYPE: ACUTE PAIN;CHRONIC PAIN;SURGICAL PAIN
TYPE: ACUTE PAIN
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
## 2025-07-25 NOTE — PROCEDURES
DATE OF PROCEDURE: 7/25/2025     PRE-PROCEDURE DIAGNOSES: Recurrent Right Traumatic Pneumothorax with extensive chest wall subcutaneous emphysema    POST-PROCEDURE DIAGNOSES: Same     PROCEDURE PERFORMED:   Right tube thoracostomy    PERFORMED BY: Dionicio Garcia M.D.      FINDINGS: Release of air upon entering the right pleural space.  Fairly consistent airleak noted once connected to suction.  Suspect the chest tube placed on admit is in the lung fissure.      PROCEDURE: The patient was properly identified and optimally positioned with the arm restrained and padded above the patient's head. Continuous hemodynamic and oxygen saturation monitoring was employed through out the procedure.  Moderate intravenous sedation was administered. The right chest wall was widely prepped and draped into a sterile field.     Local anesthetic was used to infiltrate the chest tube site.  A 3-cm transverse incision was made in the mid-axillary line and the subcutaneous tissue spread bluntly. The thoracic cavity was accessed bluntly over the rib.  A finger was placed through the tract confirming intra-thoracic entry.  A 24 Fr chest tube was directed toward the chest apex and secured to the skin with an 0-Ethibond suture.     The chest tube was connected to closed suction drainage and a sterile chest tube dressing was applied. The patient tolerated the procedure well. There were no apparent complications.  A post-procedural chest x-ray was ordered and showed improvement in the pre-procedural chest x-ray findings.  ____________________________________   Tony Garcia MD, FACS    DD: 7/25/2025  11:07 AM
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
## 2025-07-25 NOTE — CARE PLAN
Problem: Ventilation  Goal: Ability to achieve and maintain unassisted ventilation or tolerate decreased levels of ventilator support  Description: Target End Date:  4 days     Document on Vent flowsheet    1.  Support and monitor invasive and noninvasive mechanical ventilation  2.  Monitor ventilator weaning response  3.  Perform ventilator associated pneumonia prevention interventions  4.  Manage ventilation therapy by monitoring diagnostic test results  Outcome: Not Progressing     Ventilator Daily Summary    Vent Day #1  Airway:8 @25     Ventilator settings:18/450/8/40   Weaning trials: no  Respiratory Procedures:intubation     Plan: Continue current ventilator settings and wean mechanical ventilation as tolerated per physician orders.
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
## 2025-07-25 NOTE — CARE PLAN
The patient is Watcher - Medium risk of patient condition declining or worsening    Shift Goals  Clinical Goals: stabel vitals; pulmonary hygeine  Patient Goals: food & water  Family Goals: patient updates    Progress made toward(s) clinical / shift goals:  stable vitals    Problem: Skin Integrity  Goal: Skin Integrity is maintained or improved  Intervention: Kamar Skin Risk Assessment  Kamar assessed q shift-15, repositioned q 2hrs  Intervention: TURN EVERY 2 HOURS WHILE ON BEDREST  Pillows for positioning, mattress pressure weight specific, SCDs in place, Heels Floated in Place    Problem: Knowledge Deficit - Standard  Goal: Patient and family/care givers will demonstrate understanding of plan of care, disease process/condition, diagnostic tests and medications  Outcome: Progressing      Problem: Fall Risk  Goal: Patient will remain free from falls  Outcome: Progressing     Problem: Pain - Standard  Goal: Alleviation of pain or a reduction in pain to the patient’s comfort goal  Outcome: Progressing         Patient is not progressing towards the following goals: pulmonary hygeine

## 2025-07-25 NOTE — CARE PLAN
The patient is Watcher - Medium risk of patient condition declining or worsening    Shift Goals  Clinical Goals: stabel vitals; pulmonary hygeine  Patient Goals: food & water  Family Goals: patient updates    Progress made toward(s) clinical / shift goals:  Updated on POC, all questions answered, medicated per MAR.     Problem: Knowledge Deficit - Standard  Goal: Patient and family/care givers will demonstrate understanding of plan of care, disease process/condition, diagnostic tests and medications  Outcome: Progressing     Problem: Skin Integrity  Goal: Skin integrity is maintained or improved  7/25/2025 1642 by Autumn Todd R.N.  Outcome: Progressing  7/25/2025 1641 by Autumn Todd R.N.  Outcome: Progressing     Problem: Fall Risk  Goal: Patient will remain free from falls  Outcome: Progressing     Problem: Pain - Standard  Goal: Alleviation of pain or a reduction in pain to the patient’s comfort goal  7/25/2025 1642 by Autumn Todd R.N.  Outcome: Progressing  7/25/2025 1641 by PARI NunezN.  Outcome: Progressing     Problem: Safety - Medical Restraint  Goal: Remains free of injury from restraints (Restraint for Interference with Medical Device)  Outcome: Progressing       Patient is not progressing towards the following goals:

## 2025-07-25 NOTE — PROCEDURES
DATE OF PROCEDURE: 7/25/2025     INDICATION: impending respiratory failure    PROCEDURE PERFORMED: Rapid sequence endotracheal intubation.    SURGEON: Tony Garcia MD    PROCEDURE: The patient was properly identified and optimally positioned in bed. The patient was preoxygenated with 100% oxygen. Continuous hemodynamic and oxygen saturation monitoring was employed through out the performance of the procedure.  A rapid sequence induction consisting of a sedative and paralytic was administered intravenously.  Cricoid pressure was applied by an assistant.    A grade 1 view of the airway was achieved with a Glide Scope on the first attempt. A cuffed 8 endotracheal tube was passed and the cuff inflated. There was brisk color change on the CO2 disk once attached to the endotracheal tube and symmetric rise and fall of the chest was noted with application of the bag-valve mask.  End tidal CO2 monitoring confirmed return of carbon dioxide. There were no significant episodes of hypoxia throughout the procedure.  The tube was secured per Respiratory Therapy protocol.     The patient tolerated the procedure well. There were no apparent complications.    ____________________________________   Tony Garcia MD, FACS    JRU / NTS   DD: 7/25/2025  10:43 AM

## 2025-07-26 ENCOUNTER — APPOINTMENT (OUTPATIENT)
Dept: RADIOLOGY | Facility: MEDICAL CENTER | Age: 65
DRG: 957 | End: 2025-07-26
Payer: COMMERCIAL

## 2025-07-26 PROBLEM — D62 ACUTE BLOOD LOSS ANEMIA: Status: ACTIVE | Noted: 2025-07-26

## 2025-07-26 PROCEDURE — 71045 X-RAY EXAM CHEST 1 VIEW: CPT

## 2025-07-26 ASSESSMENT — PAIN DESCRIPTION - PAIN TYPE
TYPE: ACUTE PAIN
TYPE: ACUTE PAIN;CHRONIC PAIN;SURGICAL PAIN
TYPE: ACUTE PAIN
TYPE: ACUTE PAIN
TYPE: ACUTE PAIN;CHRONIC PAIN;SURGICAL PAIN
TYPE: ACUTE PAIN
TYPE: ACUTE PAIN;CHRONIC PAIN;SURGICAL PAIN
TYPE: ACUTE PAIN
TYPE: ACUTE PAIN

## 2025-07-26 ASSESSMENT — FIBROSIS 4 INDEX: FIB4 SCORE: 9.79

## 2025-07-26 ASSESSMENT — PULMONARY FUNCTION TESTS: FVC: 0.89
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
## 2025-07-26 NOTE — PROGRESS NOTES
Called made to patients medical decision maker (Kika Esposito) regarding patient Hgb of 6.6 & MD recommendation for blood transfusion     Medical decision maker is unsure of her willingness to give consent for blood transfusion.    Medical decision maker believes patient would not have objection to blood product but has a personal believe against blood transfusions; medical decision maker states will call back in an hour after speaking with other family    MD notified of delayed consent for blood transfusion
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
## 2025-07-26 NOTE — CARE PLAN
The patient is Watcher - Medium risk of patient condition declining or worsening    Shift Goals  Clinical Goals: hemodynamic stability  Patient Goals: comfort  Family Goals: updates    Progress made toward(s) clinical / shift goals:  Updated sister on POC, all questions answered, medicated per MAR, mobilized EOB for 5 minutes and tolerated well.     Problem: Knowledge Deficit - Standard  Goal: Patient and family/care givers will demonstrate understanding of plan of care, disease process/condition, diagnostic tests and medications  Outcome: Progressing     Problem: Skin Integrity  Goal: Skin integrity is maintained or improved  Outcome: Progressing     Problem: Fall Risk  Goal: Patient will remain free from falls  Outcome: Progressing     Problem: Pain - Standard  Goal: Alleviation of pain or a reduction in pain to the patient’s comfort goal  Outcome: Progressing     Problem: Safety - Medical Restraint  Goal: Remains free of injury from restraints (Restraint for Interference with Medical Device)  Outcome: Progressing     Problem: Safety - Medical Restraint  Goal: Free from restraint(s) (Restraint for Interference with Medical Device)  Outcome: Progressing

## 2025-07-26 NOTE — ASSESSMENT & PLAN NOTE
Progressive decline in hemoglobin in the post-op period  7/26 Transfuse 1U PRBC, IV iron replacement  7/28 Hemoglobin 6.3. 1U PRBC.   7/31 HGB 6.7.Transfused PRBC.  Trend hemoglobin serially

## 2025-07-26 NOTE — CARE PLAN
Problem: Ventilation  Goal: Ability to achieve and maintain unassisted ventilation or tolerate decreased levels of ventilator support  Description: Target End Date:  4 days     Document on Vent flowsheet    1.  Support and monitor invasive and noninvasive mechanical ventilation  2.  Monitor ventilator weaning response  3.  Perform ventilator associated pneumonia prevention interventions  4.  Manage ventilation therapy by monitoring diagnostic test results  Outcome: Progressing     Ventilator Daily Summary    Vent Day # 2  Airway:  8 @ 25    Ventilator settings: APVCMV 16/450/8/40%      Plan: Continue current ventilator settings and wean mechanical ventilation as tolerated per physician orders.

## 2025-07-26 NOTE — CARE PLAN
Problem: Ventilation  Goal: Ability to achieve and maintain unassisted ventilation or tolerate decreased levels of ventilator support  Description: Target End Date:  4 days     Document on Vent flowsheet    1.  Support and monitor invasive and noninvasive mechanical ventilation  2.  Monitor ventilator weaning response  3.  Perform ventilator associated pneumonia prevention interventions  4.  Manage ventilation therapy by monitoring diagnostic test results  Outcome: Progressing     Problem: Hyperinflation  Goal: Prevent or improve atelectasis  Description: Target End Date:  3 to 4 days    1. Instruct incentive spirometry usage  2.  Perform hyperinflation therapy as indicated  Outcome: Progressing   Pt changed to ASV, tolerating well. SBT for 4 hours.

## 2025-07-27 ENCOUNTER — APPOINTMENT (OUTPATIENT)
Dept: RADIOLOGY | Facility: MEDICAL CENTER | Age: 65
DRG: 957 | End: 2025-07-27
Payer: COMMERCIAL

## 2025-07-27 ENCOUNTER — APPOINTMENT (OUTPATIENT)
Dept: RADIOLOGY | Facility: MEDICAL CENTER | Age: 65
DRG: 957 | End: 2025-07-27
Attending: SURGERY
Payer: COMMERCIAL

## 2025-07-27 ENCOUNTER — APPOINTMENT (OUTPATIENT)
Dept: RADIOLOGY | Facility: MEDICAL CENTER | Age: 65
End: 2025-07-27
Attending: SURGERY
Payer: OTHER MISCELLANEOUS

## 2025-07-27 PROCEDURE — 71045 X-RAY EXAM CHEST 1 VIEW: CPT

## 2025-07-27 ASSESSMENT — PAIN DESCRIPTION - PAIN TYPE
TYPE: ACUTE PAIN
TYPE: ACUTE PAIN;CHRONIC PAIN;SURGICAL PAIN
TYPE: ACUTE PAIN;CHRONIC PAIN;SURGICAL PAIN
TYPE: ACUTE PAIN
TYPE: ACUTE PAIN;CHRONIC PAIN;SURGICAL PAIN
TYPE: ACUTE PAIN
TYPE: ACUTE PAIN;CHRONIC PAIN;SURGICAL PAIN
TYPE: ACUTE PAIN;CHRONIC PAIN;SURGICAL PAIN
TYPE: ACUTE PAIN
TYPE: ACUTE PAIN;CHRONIC PAIN;SURGICAL PAIN

## 2025-07-27 ASSESSMENT — LIFESTYLE VARIABLES: REASON UNABLE TO ASSESS: INTUBATED

## 2025-07-27 NOTE — CARE PLAN
The patient is Watcher - Medium risk of patient condition declining or worsening    Shift Goals  Clinical Goals: hemodynamic stability  Patient Goals: comfort  Family Goals: updates    Progress made toward(s) clinical / shift goals:  stable vitals    Problem: Skin Integrity  Goal: Skin Integrity is maintained or improved  Intervention: Kamar Skin Risk Assessment  Kamar assessed q shift-13, repositioned q 2hrs  Intervention: TURN EVERY 2 HOURS WHILE ON BEDREST  Pillows for positioning, mattress pressure weight specific, SCDs in place, Heels Floated in Place    Problem: Knowledge Deficit - Standard  Goal: Patient and family/care givers will demonstrate understanding of plan of care, disease process/condition, diagnostic tests and medications  Outcome: Progressing      Problem: Fall Risk  Goal: Patient will remain free from falls  Outcome: Progressing     Problem: Safety - Medical Restraint  Goal: Remains free of injury from restraints (Restraint for Interference with Medical Device)  Outcome: Progressing  Goal: Free from restraint(s) (Restraint for Interference with Medical Device)  Outcome: Progressing     Problem: Pain - Standard  Goal: Alleviation of pain or a reduction in pain to the patient’s comfort goal  Outcome: Progressing         Patient is not progressing towards the following goals: stable Hgb

## 2025-07-27 NOTE — CARE PLAN
The patient is Watcher - Medium risk of patient condition declining or worsening    Shift Goals  Clinical Goals: hemodynamic stability  Patient Goals: comfort  Family Goals: updates    Progress made toward(s) clinical / shift goals:  stable Hgb    Problem: Skin Integrity  Goal: Skin Integrity is maintained or improved  Intervention: Kamar Skin Risk Assessment  Kamar assessed q shift-14, repositioned q 2hrs  Intervention: TURN EVERY 2 HOURS WHILE ON BEDREST  Pillows for positioning, mattress pressure weight specific, SCDs in place, Heels Floated in Place    Problem: Knowledge Deficit - Standard  Goal: Patient and family/care givers will demonstrate understanding of plan of care, disease process/condition, diagnostic tests and medications  Outcome: Progressing      Problem: Fall Risk  Goal: Patient will remain free from falls  Outcome: Progressing     Problem: Safety - Medical Restraint  Goal: Remains free of injury from restraints (Restraint for Interference with Medical Device)  Outcome: Progressing  Goal: Free from restraint(s) (Restraint for Interference with Medical Device)  Outcome: Progressing     Problem: Pain - Standard  Goal: Alleviation of pain or a reduction in pain to the patient’s comfort goal  Outcome: Progressing         Patient is not progressing towards the following goals: N/a
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
## 2025-07-27 NOTE — PROCEDURES
DATE OF OPERATION:  7/27/2025    PREOPERATIVE DIAGNOSIS: Right hemopneumothorax.    POSTOPERATIVE DIAGNOSIS: Right hemopneumothorax.     PROCEDURE PERFORMED: Right anterior pigtail tube thoracostomy.    PROVIDER:    Melissa Fuentes P.A.-C.    ASSISTANT:    Dionicio Garcia M.D.    ANESTHESIA:   local anesthesia, intravenous sedation, and pharmacologic restraint.    INDICATIONS: The patient is a 65 year-old man with a right hemopneumothorax. A right anterior  thoracostomy was performed.      ESTIMATED BLOOD LOSS:  < 5 mL.    PROCEDURE: Following informed consent consent, the patient was properly identified and optimally positioned. An anesthetic consisting of local anesthesia, intravenous sedation, and pharmacologic restraint was administered. The right chest wall was widely prepped with ChloraPrep® and draped into a sterile field.      Local anesthetic was used to infiltrate the chest tube site.   The thoracic cavity was accessed percutaneously in the 2nd intercostal space, midclavicular line and a .032 flexible guide wire was advanced without resistance. A [employer] 10 Fr x 25 cm Flexima APDL catheter chest tube was advanced using sterile Seldinger technique. The pigtail was locked into position and the locking hub secured.    The chest tube was connected to closed suction drainage and a sterile dressing was applied. The patient tolerated the procedure well. There were no apparent complications.        ____________________________________     Melissa Fuentes P.A.-C.    DD: 7/27/2025  2:08 PM
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
## 2025-07-28 ENCOUNTER — APPOINTMENT (OUTPATIENT)
Dept: RADIOLOGY | Facility: MEDICAL CENTER | Age: 65
DRG: 957 | End: 2025-07-28
Payer: COMMERCIAL

## 2025-07-28 ENCOUNTER — APPOINTMENT (OUTPATIENT)
Dept: RADIOLOGY | Facility: MEDICAL CENTER | Age: 65
DRG: 957 | End: 2025-07-28
Attending: STUDENT IN AN ORGANIZED HEALTH CARE EDUCATION/TRAINING PROGRAM
Payer: COMMERCIAL

## 2025-07-28 PROCEDURE — 71045 X-RAY EXAM CHEST 1 VIEW: CPT

## 2025-07-28 ASSESSMENT — PAIN DESCRIPTION - PAIN TYPE
TYPE: ACUTE PAIN

## 2025-07-28 NOTE — CARE PLAN
Problem: Ventilation  Goal: Ability to achieve and maintain unassisted ventilation or tolerate decreased levels of ventilator support  Description: Target End Date:  4 days     Document on Vent flowsheet    1.  Support and monitor invasive and noninvasive mechanical ventilation  2.  Monitor ventilator weaning response  3.  Perform ventilator associated pneumonia prevention interventions  4.  Manage ventilation therapy by monitoring diagnostic test results  Outcome: Progressing   V4   8.0 @25  , 8, 50%
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
## 2025-07-28 NOTE — PROCEDURES
DATE OF OPERATION: 7/28/2025     PREOPERATIVE DIAGNOSIS: chest radiograph infiltrate and purulent secretions    POSTOPERATIVE DIAGNOSIS: chest radiograph infiltrate and purulent secretions    PROCEDURE PERFORMED: Diagnostic flexible fiberoptic bronchoscopy with bronchoalveolar lavage.    SURGEON: Jason Rick M.D.    ANESTHESIA: Anesthesia consisting of intravenous sedation and topical anesthetic was administered.    INDICATIONS: The patient is a 65 year-old White man with acute respiratory failure, chest radiograph infiltrate and purulent secretions. Diagnostic flexible fiberoptic bronchoscopy with bronchoalveolar lavage is performed in the ICU.    FINDINGS: Normal anatomy.  Purulent Secretions: at the bonny, right mainstem bronchus, right upper lobe bronchus, and left lower lobe bronchus.    SPECIMEN: Bronchoalveolar lavage for quantitative culture. BAL location right superior lobe.    PROCEDURE: Following informed consent consent, the patient was properly identified and optimally positioned in bed. Jairo was preoxygenated with 100% oxygen and placed on a regular ventilatory rate. Anesthesia consisting of intravenous sedation and topical anesthetic was administered. A timeout was conducted with the full attention and participation of all procedure personnel.    The fiberoptic bronchoscope was advanced through the endotracheal tube. Diagnostic bronchoscopy with bronchoalveolar lavage was performed. All airways were evaluated to the sub-segmental level. The airways were systematically and sequentially inspected and lavaged with sterile saline using a wedged alveolar technique.  Diagnostic specimens were obtained from the right superior lobe. The pooled specimen effluent was collected in a sterile specimen trap and submitted to the laboratory for quantitative cultures.      The patient tolerated the procedure well. There were no apparent complications.         ____________________________________      Jason Rick M.D.    DD: 7/28/2025  2:50 PM

## 2025-07-28 NOTE — PROGRESS NOTES
"    Orthopedic PA Progress Note    Interval changes:  Patient remains intubated.  RLE dressings CDI s/p fixation tibia fxs  LLE boot to remain in place  NWB RLE  WBAT LLE  No pending ortho procedures    ROS - Pain appears controlled.    /58   Pulse 70   Temp 36.7 °C (98 °F) (Bladder)   Resp 18   Ht 1.803 m (5' 10.98\")   Wt 80.1 kg (176 lb 9.4 oz)   SpO2 98%     Patient seen and examined  Intubated, sedated  RRR  RLE: Dressings CDI. DF/PF intact. Flexes and extends toes. Sensation appears intact. DP pulse 2+. Pulses heard on doppler     Recent Labs     07/26/25  0545 07/27/25  0440 07/28/25  0425   WBC 5.8 5.9 6.9   RBC 2.16* 2.32* 2.00*   HEMOGLOBIN 6.6* 7.2* 6.3*   HEMATOCRIT 20.4* 21.1* 18.8*   MCV 94.4 90.9 94.0   MCH 30.6 31.0 31.5   MCHC 32.4 34.1 33.5   RDW 47.8 48.2 47.7   PLATELETCT 119* 141* 162*   MPV 10.9 10.7 10.5       Active Hospital Problems    Diagnosis     Pneumothorax, right [J93.9]      Priority: High    Acute respiratory failure following trauma with hypoventilation and hypoxia [J96.90]      Priority: High    Grade III injury of left kidney [S37.002A]      Priority: High    Closed fracture of transverse process of cervical vertebra (HCC) [S12.9XXA]      Priority: High    Multiple fractures of ribs, bilateral, initial encounter for closed fracture [S22.43XA]      Priority: High    Pneumothorax, left [J93.9]      Priority: High    Acute blood loss anemia [D62]      Priority: Medium    Open fracture of right tibia and fibula [S82.201B, S82.401B]      Priority: Medium    Nasal bone fracture [S02.2XXA]      Priority: Medium    Pneumoperitoneum [K66.8]      Priority: Medium    No contraindication to deep vein thrombosis (DVT) prophylaxis [Z78.9]      Priority: Medium    Adrenal insufficiency (HCC) [E27.40]      Priority: Medium    Trauma [T14.90XA]      Priority: Low    Closed fracture of distal end of left fibula [S82.832A]      Priority: Low       DX-CHEST-PORTABLE (1 VIEW)   Final Result "      No definite residual pneumothorax. Stable chest and neck soft tissue emphysema.      DX-CHEST-PORTABLE (1 VIEW)   Final Result      Interval placement of right-sided chest tube with mildly increased right pneumothorax.      CT-CHEST (THORAX) W/O   Final Result      1.  Moderate right hydropneumothorax. Trace left pneumothorax. Bilateral chest tubes in place.   2.  Pneumomediastinum.   3.  Partially collapsed right upper lobe, right middle lobe and right lower lobe.   4.  Extensive subcutaneous edema throughout the lower neck and bilateral chest wall.   5.  Bilateral rib fractures are redemonstrated.         DX-CHEST-PORTABLE (1 VIEW)   Final Result      1.  Small bilateral pneumothoraces.   2.  Stable bilateral chest and neck soft tissue emphysema.      DX-CHEST-PORTABLE (1 VIEW)   Final Result      No acute process.      DX-CHEST-PORTABLE (1 VIEW)   Final Result   Addendum (preliminary) 1 of 1   Addendum: NG tube extends into the fundus of the stomach.      Final      1.  Interval placement of a second right-sided chest tube with interval resolution of right-sided pneumothorax.   2.  Minimal residual left apical pneumothorax.   3.  Extensive subcutaneous emphysema throughout the chest and base of the neck.   4.  Pneumomediastinum.   5.  Numerous bilateral rib fractures in the upper hemithoraces.      DX-CHEST-PORTABLE (1 VIEW)   Final Result      1.  Multiple bilateral rib fractures.   2.  Extensive soft tissue emphysema.   3.  Moderate right-sided pneumothorax with slight increase in size since prior exam.      DX-CHEST-LIMITED (1 VIEW)   Final Result         1.  Interstitial opacities suggests subtle edema and/or infiltrates, stable since prior studies.   2.  Right pneumothorax, increased in size since prior study.   3.  Extensive soft tissue gas in the bilateral chest wall and neck   4.  Bilateral rib fractures      DX-CHEST-LIMITED (1 VIEW)   Final Result         1.  Interstitial opacities suggests subtle  edema and/or infiltrates, stable since prior studies.   2.  Right pneumothorax, decreased in size since prior study.   3.  Extensive soft tissue gas in the bilateral chest wall and neck   4.  Bilateral rib fractures      DX-CHEST-PORTABLE (1 VIEW)   Final Result         1.  Interstitial opacities suggests subtle edema and/or infiltrates, stable since prior studies.   2.  Large recurrent right pneumothorax, significantly increased since prior study. Subsequent film demonstrates reexpansion of the right lung.   3.  Extensive soft tissue gas in the bilateral chest wall and neck   4.  Bilateral rib fractures      CT-MAXILLOFACIAL W/O PLUS RECONS   Final Result         1.  Right anterior maxillary sinus wall fracture, age indeterminant.   2.  Extensive subcutaneous edema throughout the visualized neck extending onto the bilateral face and in the left periorbital soft tissues.   3.  Bilateral maxillary sinusitis changes.      CT-CTA NECK WITH & W/O-POST PROCESSING   Final Result         1.  No visualized aneurysm, dissection, occlusion, or high-grade stenosis identified.   2.  Large right and small left pneumothorax, thoracostomy tubes are in place.   3.  Cervical spine fractures, see dedicated CT cervical spine yesterday for further characterization.      These findings were discussed with the patient's clinician, Leslie Pierre, on 7/25/2025 12:41 AM.         DX-CHEST-PORTABLE (1 VIEW)   Final Result         1.  Interstitial opacities suggests subtle edema and/or infiltrates.   2.  Extensive soft tissue gas in the bilateral chest wall and neck   3.  Bilateral rib fractures      US-TRAUMA VEIN SCREEN LOWER BILAT EXTREMITY   Final Result      EC-ECHOCARDIOGRAM LTD W/O CONT   Final Result      DX-CHEST-PORTABLE (1 VIEW)   Final Result      1.  Extensive subcutaneous emphysema and pneumomediastinum.   2.  Small residual bilateral pneumothoraces.   3.  Multiple bilateral rib fractures in the upper hemithoraces.       DX-PORTABLE FLUORO > 1 HOUR   Final Result      Portable fluoroscopy utilized for 2 minutes 30 seconds.         INTERPRETING LOCATION: [address], [place], [address]      DX-TIBIA AND FIBULA RIGHT   Final Result      Digitized intraoperative radiograph is submitted for review. This examination is not for diagnostic purpose but for guidance during a surgical procedure. Please see the patient's chart for full procedural details.         INTERPRETING LOCATION: [address], [place], [address]      DX-CHEST-PORTABLE (1 VIEW)   Final Result      1.  Bilateral chest tubes present. Previously seen pneumothoraces have resolved.      2.  Multiple bilateral rib fractures.      US-ABDOMEN F.A.S.T. LTD (FOR ED USE ONLY)   Final Result      No free fluid seen in all 4 quadrants.      Pneumoperitoneum.            CT-LSPINE W/O PLUS RECONS   Final Result      1.  Mild dextroscoliosis of the lumbar spine.   2.  Moderate discal degenerative changes from the L3-4 level through the L5-S1 level with mild marginal osteophytosis.   3.  No evidence of acute lumbar spine fracture and/or subluxation.   4.  Intraparenchymal hematoma involving the lateral convexity of the left kidney. Grade 3 left renal injury.   5.  Wedge-shaped areas of decreased attenuation involving the left upper pole and left lower pole posteriorly consistent with renal infarcts/contusions.      CT-TSPINE W/O PLUS RECONS   Final Result      1.  Left C7 nondisplaced transverse process fracture.   2.  Multiple posterior rib fractures from T1 through T4 bilaterally and on the left extending to T6.   3.  Small right pneumothorax and moderate left pneumothorax.   4.  Extensive pneumomediastinum and subcutaneous emphysema.   5.  No evidence for acute fracture or subluxation of the thoracic spine      CT-CTA LOWER EXT WITH & W/O-POST PROCESS RIGHT   Final Result      1.  Comminuted fracture of the metadiaphyseal diaphyseal region of the right proximal tibia.   2.  Oblique fracture  of the right distal tibia.   3.  Comminuted fracture of the midshaft of the right fibula in near-anatomic alignment.   4.  No evidence of acute arterial injury in the right lower extremity.         CT-CHEST,ABDOMEN,PELVIS WITH   Final Result      1.  Extensive pneumomediastinum and subcutaneous emphysema throughout the chest and neck.   2.  Small residual right anterior pneumothorax following chest tube placement.   3.  Moderate left pneumothorax.   4.  Multiple bilateral rib fractures as described above.   5.  Extensive pneumoperitoneum suspicious for perforation of a hollow viscus.   6.  Wedge-shaped intraparenchymal hemorrhage involving the lateral aspect of the left kidney consistent with a grade 3 renal injury.   7.  Multiple left renal infarcts/contusions. No evidence of free fluid about the left kidney.      8.  These findings were discussed with LISA PRABHAKAR at 9:40 AM 7/23/2025      CT-CSPINE WITHOUT PLUS RECONS   Final Result      1.  No evidence of cervical spine fracture and/or subluxation.   2.  Moderate osteoarthritic changes at C5-6 and C6-7 levels.   3.  Extensive subcutaneous emphysema and pneumomediastinum.   4.  Multiple upper rib fractures posteriorly and laterally.   5.  Left C7 transverse process fracture nondisplaced.   6.  Left first rib fracture near its costovertebral junction and also anteriorly.   7.  Bilateral second through fourth rib fractures posteriorly.   8.  Small right pneumothorax and moderate left pneumothorax.      CT-HEAD W/O   Final Result      1.  Nondisplaced left-sided nasal bone fracture.   2.  Extensive subcutaneous emphysema about the skull base and involving the temporalis fossa and  spaces bilaterally as well as the parapharyngeal spaces.   3.  Left-sided pneumoorbita.   4.  No evidence of intracranial hemorrhage or subdural hematoma formation.                  DX-PELVIS-1 OR 2 VIEWS   Final Result      Normal AP view of the pelvis.      DX-CHEST-LIMITED  (1 VIEW)   Final Result      1.  Interval increase in size of small to moderate left-sided pneumothorax.   2.  Interval resolution of right-sided pneumothorax following chest tube placement.   3.  Extensive subcutaneous emphysema throughout the base of the neck and chest.   4.  Pneumomediastinum.   5.  Multiple bilateral rib fractures in the upper and mid hemithoraces.      DX-CHEST-LIMITED (1 VIEW)   Final Result      1.  Moderate right-sided pneumothorax and small left-sided pneumothorax.   2.  Extensive subcutaneous emphysema about the chest and base of the neck.   3.  Pneumomediastinum.   4.  Multiple bilateral rib fractures in the upper and mid hemithoraces bilaterally.      DX-TIBIA AND FIBULA RIGHT   Final Result      1.  Markedly comminuted fracture of the metadiaphyseal region of the right proximal tibia was slight volar angulation.      2.  Oblique fracture of distal diaphyseal region of right tibia with slight volar angulation.      3.  Slightly comminuted mid shaft right fibular fracture with slight volar angulation          Assessment/Plan:  Patient remains intubated.  RLE dressings CDI s/p fixation tibia fxs  LLE boot to remain in place  NWB RLE  WBAT LLE  No pending ortho procedures    POD#5 S/p  Intramedullary nailing of right segmental tibia shaft fracture  Percutaneous screw fixation tibial plateau  Wt bearing status - NWB RLE. WBAT LLE  Future Procedures - None planned   Wound care/Drains - Dressings to be left in place until POD3; ok to change every other day with gauze, soft roll and ace  Sutures/Staples out- 14-21 days post operatively. Removal will completed by ortho [facility] unless transferred.  Inpatient DVT prophylaxis: Lovenox initiated 7/24  DVT Prophylaxis outpatient: ASA 81 mg PO BID x4 weeks  PT/OT-initiated  Antibiotics:  Perioperative completed  DVT Prophylaxis- TEDS/SCDs/Foot pumps  Case Coordination for Discharge Planning - Disposition per therapy recs.   Follow up with Dr. Ragsdale 2  weeks following final surgery for repeat imaging and wound check. (Est follow up date 8/6)

## 2025-07-28 NOTE — CARE PLAN
The patient is Watcher - Medium risk of patient condition declining or worsening    Shift Goals  Clinical Goals: hemodynamic stabilit  Patient Goals: DYLAN  Family Goals: DYLAN    Progress made toward(s) clinical / shift goals:    Problem: Knowledge Deficit - Standard  Goal: Patient and family/care givers will demonstrate understanding of plan of care, disease process/condition, diagnostic tests and medications  Description: Target End Date:  1-3 days or as soon as patient condition allows    Document in Patient Education    1.  Patient and family/caregiver oriented to unit, equipment, visitation policy and means for communicating concern  2.  Complete/review Learning Assessment  3.  Assess knowledge level of disease process/condition, treatment plan, diagnostic tests and medications  4.  Explain disease process/condition, treatment plan, diagnostic tests and medications  Outcome: Progressing     Problem: Pain - Standard  Goal: Alleviation of pain or a reduction in pain to the patient’s comfort goal  Description: Target End Date:  Prior to discharge or change in level of care    Document on Vitals flowsheet    1.  Document pain using the appropriate pain scale per order or unit policy  2.  Educate and implement non-pharmacologic comfort measures (i.e. relaxation, distraction, massage, cold/heat therapy, etc.)  3.  Pain management medications as ordered  4.  Reassess pain after pain med administration per policy  5.  If opiods administered assess patient's response to pain medication is appropriate per POSS sedation scale  6.  Follow pain management plan developed in collaboration with patient and interdisciplinary team (including palliative care or pain specialists if applicable)  Outcome: Progressing     Problem: Safety - Medical Restraint  Goal: Remains free of injury from restraints (Restraint for Interference with Medical Device)  Description: INTERVENTIONS:  1. Determine that other, less restrictive measures have  been tried or would not be effective before applying the restraint  2. Evaluate the patient's condition at the time of restraint application  3. Educate patient/family regarding the reason for restraint  4. Q2H: Monitor safety, psychosocial status, comfort, circulation, respiratory status, LOC, nutrition and hydration  Outcome: Progressing  Goal: Free from restraint(s) (Restraint for Interference with Medical Device)  Description: INTERVENTIONS:  1.  ONCE/SHIFT or MINIMUM Q12H: Assess and document the continuing need for restraints  2.  Q24H: Continued use of restraint requires LIP to perform face to face examination and written order  3.  Identify and implement measures to help patient regain control  4.  Educate patient/family on discontinuation criteria   5.  Assess patient's understanding and retention of education provided  6.  Assess readiness for release & initiate progressive release per protocol  7.  Identify and document criteria for restraints  Outcome: Progressing       Patient is not progressing towards the following goals:

## 2025-07-28 NOTE — PROGRESS NOTES
Dr. Rick at bedside for bronchoscopy. All emergent equipment set up. RT and ACLS RN bedside.     Dr. Rick bedside and increased propofol to 50 mcg/kg/min at 1428    Lidocaine 1% 10 mL given via endotracheal tube per Dr. Rick at 1432.    Time out called, all agreed at 1433.    Bronchoscopy started ay 1433.    1 mg of dilaudid given at 1433 per Dr. Rick.    Per Dr. Rick increase propofol drip to 70 mcg/kg/min at 1434.    ET Tube pulled back to 23cm at the lips at 1439.    BAL sample obtained 1441.     Bronchoscopy ended at 1441.    Dr. Rick to leave bedside at 1441.

## 2025-07-29 ENCOUNTER — APPOINTMENT (OUTPATIENT)
Dept: RADIOLOGY | Facility: MEDICAL CENTER | Age: 65
DRG: 957 | End: 2025-07-29
Attending: STUDENT IN AN ORGANIZED HEALTH CARE EDUCATION/TRAINING PROGRAM
Payer: COMMERCIAL

## 2025-07-29 ENCOUNTER — APPOINTMENT (OUTPATIENT)
Dept: RADIOLOGY | Facility: MEDICAL CENTER | Age: 65
DRG: 957 | End: 2025-07-29
Payer: COMMERCIAL

## 2025-07-29 ENCOUNTER — APPOINTMENT (OUTPATIENT)
Dept: RADIOLOGY | Facility: MEDICAL CENTER | Age: 65
DRG: 957 | End: 2025-07-29
Attending: PHYSICIAN ASSISTANT
Payer: COMMERCIAL

## 2025-07-29 PROCEDURE — 74018 RADEX ABDOMEN 1 VIEW: CPT

## 2025-07-29 PROCEDURE — 73590 X-RAY EXAM OF LOWER LEG: CPT | Mod: LT

## 2025-07-29 PROCEDURE — 71045 X-RAY EXAM CHEST 1 VIEW: CPT

## 2025-07-29 ASSESSMENT — COGNITIVE AND FUNCTIONAL STATUS - GENERAL
EATING MEALS: TOTAL
MOBILITY SCORE: 6
TOILETING: TOTAL
CLIMB 3 TO 5 STEPS WITH RAILING: TOTAL
DRESSING REGULAR LOWER BODY CLOTHING: TOTAL
SUGGESTED CMS G CODE MODIFIER MOBILITY: CN
SUGGESTED CMS G CODE MODIFIER DAILY ACTIVITY: CN
MOVING FROM LYING ON BACK TO SITTING ON SIDE OF FLAT BED: TOTAL
STANDING UP FROM CHAIR USING ARMS: TOTAL
WALKING IN HOSPITAL ROOM: TOTAL
DAILY ACTIVITIY SCORE: 6
MOVING TO AND FROM BED TO CHAIR: TOTAL
HELP NEEDED FOR BATHING: TOTAL
TURNING FROM BACK TO SIDE WHILE IN FLAT BAD: TOTAL
PERSONAL GROOMING: TOTAL
DRESSING REGULAR UPPER BODY CLOTHING: TOTAL

## 2025-07-29 ASSESSMENT — PAIN DESCRIPTION - PAIN TYPE
TYPE: ACUTE PAIN
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
## 2025-07-29 NOTE — PROGRESS NOTES
"      Orthopaedic Progress Note    Interval changes:  Patient doing well   RLE dressings are CDI  Non op distal fibula fx- in cam boot at all times   Cleared for DC to rehab by ortho pending trauma clearance    ROS - Patient denies any new issues.  Pain well controlled.    /56   Pulse 83   Temp 37.6 °C (99.7 °F) (Bladder)   Resp (!) 22   Ht 1.803 m (5' 10.98\")   Wt 80.1 kg (176 lb 9.4 oz)   SpO2 100%     Patient seen and examined  No acute distress  Breathing non labored  RRR  RLE dressings CDI, DNVI, moves all toes, cap refill <2 sec.     Recent Labs     07/27/25  0440 07/28/25  0425 07/29/25  0411   WBC 5.9 6.9 9.4   RBC 2.32* 2.00* 2.29*   HEMOGLOBIN 7.2* 6.3* 7.0*   HEMATOCRIT 21.1* 18.8* 21.2*   MCV 90.9 94.0 92.6   MCH 31.0 31.5 30.6   MCHC 34.1 33.5 33.0   RDW 48.2 47.7 50.2*   PLATELETCT 141* 162* 184   MPV 10.7 10.5 10.8       Active Hospital Problems    Diagnosis     Pneumothorax, right [J93.9]      Priority: High    Acute respiratory failure following trauma with hypoventilation and hypoxia [J96.90]      Priority: High    Grade III injury of left kidney [S37.002A]      Priority: High    Closed fracture of transverse process of cervical vertebra (HCC) [S12.9XXA]      Priority: High    Multiple fractures of ribs, bilateral, initial encounter for closed fracture [S22.43XA]      Priority: High    Pneumothorax, left [J93.9]      Priority: High    Acute blood loss anemia [D62]      Priority: Medium    Open fracture of right tibia and fibula [S82.201B, S82.401B]      Priority: Medium    Nasal bone fracture [S02.2XXA]      Priority: Medium    Pneumoperitoneum [K66.8]      Priority: Medium    No contraindication to deep vein thrombosis (DVT) prophylaxis [Z78.9]      Priority: Medium    Adrenal insufficiency (HCC) [E27.40]      Priority: Medium    Trauma [T14.90XA]      Priority: Low    Closed fracture of distal end of left fibula [S82.832A]      Priority: Low       Assessment/Plan:  Patient doing well "   RLE dressings are CDI  Non op distal fibula fx- in cam boot at all times   Cleared for DC to rehab by ortho pending trauma clearance  POD#6 S/P Right tibial nailing  Wt bearing status - NWB RLE, WBAT LLE  Wound care/Drains - Dressings to be changed every other day by nursing. Or PRN for saturation    Future Procedures - none planned   Lovenox: Start 7/24, Duration-until ambulatory > 150'  Sutures/Staples out- 14-21 days post operatively. Removal by ortho mid levels only.  PT/OT-initiated  Antibiotics: maxipime 2g IV Q8  DVT Prophylaxis- TEDS/SCDs/Foot pumps  Wilson-not needed per ortho  Case Coordination for Discharge Planning - Disposition per therapy recs.

## 2025-07-29 NOTE — CARE PLAN
The patient is Watcher - Medium risk of patient condition declining or worsening    Shift Goals  Clinical Goals: pulmonary hygeine, pain mangement, improved hgb  Patient Goals: DYLAN  Family Goals: no family present    Progress made toward(s) clinical / shift goals:    Problem: Skin Integrity  Goal: Skin integrity is maintained or improved  Outcome: Progressing     Problem: Fall Risk  Goal: Patient will remain free from falls  Outcome: Progressing     Problem: Pain - Standard  Goal: Alleviation of pain or a reduction in pain to the patient’s comfort goal  Outcome: Progressing     Problem: Safety - Medical Restraint  Goal: Remains free of injury from restraints (Restraint for Interference with Medical Device)  Outcome: Progressing       Patient is not progressing towards the following goals:

## 2025-07-29 NOTE — PROGRESS NOTES
1230: Dr. Rick updated patient had one episode of small emesis, thick tube feeding appearance, nausea managed with PRNs. 60mL of gastric residuals discarded and tube feeding paused.    1730: No more episodes of emesis, patient able to communicate effectively no more nausea. Dr. Rick updated, per Dr. Rick restart tube feeding.

## 2025-07-29 NOTE — WOUND TEAM
Wound consult received regarding pts penis. Chart and images reviewed. Pt has discoloration to the side of the penis likely related to edema. No pressure injury noted, no advanced wound care needs identified.

## 2025-07-29 NOTE — PROGRESS NOTES
0945: Dr. Rick updated patient experiencing nausea again this morning. Tube feedings paused at 0800, no emesis, PRN zofran given. No abdominal distention but patient still firm and tender. KUB ordered.     1430: Updated Dr. Rick patient still having intermittent nausea. Per Dr. Rick continue holding tube feeding, place NGT to low continuous suction. Will re-address 7/30 in AM.

## 2025-07-29 NOTE — PROGRESS NOTES
Updated Dr. Rick of repeat CXR results post waterseal trial to left sided chest tube and right sided #1 chest tube. Per Dr. Rick place right sided chest tube #1 back to -20cm suction.
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
## 2025-07-29 NOTE — DISCHARGE PLANNING
Case Management Discharge Planning    Admission Date: 7/23/2025  GMLOS: 9.8  ALOS: 6    6-Clicks ADL Score: 6  6-Clicks Mobility Score: 6  PT and/or OT Eval ordered: Yes  Post-acute Referrals Ordered: Yes  Post-acute Choice Obtained: No  Has referral(s) been sent to post-acute provider:  Yes      Anticipated Discharge Dispo:  LTACH versus SNF(poss Leased bed)      Action(s) Taken: Pt was extubated and then reintubated the next day.   Hard faxed referral to [facility] [phone]    Escalations Completed: None    Medically Clear: No    Next Steps: Expand referrals to outlying area SNF if Pt's respiratory status improves. Follow up with Danny at [facility] [phone] on 7/30    Barriers to Discharge: Medical clearance, Pending Placement, Pending PT Evaluation, No Social Support, Homelessness, and Pending Procedures    Is the patient up for discharge tomorrow: No

## 2025-07-29 NOTE — DIETARY
Nutrition Services Brief Update:    Problem: Nutritional:  Goal: Nutrition support tolerated and meeting greater than 85% of estimated needs  Outcome: Progressing     Tube feeding of Jevity 1.2 reached goal rate 7/27.  Tube feeding was paused last night as pt was experiencing nausea. Tube feeding resumed shortly thereafter at 35 mL/hr.  Tube feeding was paused again this morning for nausea.     RD will follow. Resume TF per provider.

## 2025-07-29 NOTE — CARE PLAN
Problem: Ventilation  Goal: Ability to achieve and maintain unassisted ventilation or tolerate decreased levels of ventilator support  Description: Target End Date:  4 days     Document on Vent flowsheet    1.  Support and monitor invasive and noninvasive mechanical ventilation  2.  Monitor ventilator weaning response  3.  Perform ventilator associated pneumonia prevention interventions  4.  Manage ventilation therapy by monitoring diagnostic test results  Outcome: Not Met     Ventilator Daily Summary    Vent Day #5  Airway: 8 @ 25    Ventilator settings: /8/50%  Weaning trials:   Respiratory Procedures:     Plan: Continue current ventilator settings and wean mechanical ventilation as tolerated per physician orders.

## 2025-07-29 NOTE — CARE PLAN
The patient is Watcher - Medium risk of patient condition declining or worsening    Shift Goals  Clinical Goals: pulm hygiene, monitor N/V, pain management  Patient Goals: DYLAN  Family Goals: Updates    Progress made toward(s) clinical / shift goals:    Problem: Knowledge Deficit - Standard  Goal: Patient and family/care givers will demonstrate understanding of plan of care, disease process/condition, diagnostic tests and medications  Description: Target End Date:  1-3 days or as soon as patient condition allows    Document in Patient Education    1.  Patient and family/caregiver oriented to unit, equipment, visitation policy and means for communicating concern  2.  Complete/review Learning Assessment  3.  Assess knowledge level of disease process/condition, treatment plan, diagnostic tests and medications  4.  Explain disease process/condition, treatment plan, diagnostic tests and medications  Outcome: Progressing     Problem: Pain - Standard  Goal: Alleviation of pain or a reduction in pain to the patient’s comfort goal  Description: Target End Date:  Prior to discharge or change in level of care    Document on Vitals flowsheet    1.  Document pain using the appropriate pain scale per order or unit policy  2.  Educate and implement non-pharmacologic comfort measures (i.e. relaxation, distraction, massage, cold/heat therapy, etc.)  3.  Pain management medications as ordered  4.  Reassess pain after pain med administration per policy  5.  If opiods administered assess patient's response to pain medication is appropriate per POSS sedation scale  6.  Follow pain management plan developed in collaboration with patient and interdisciplinary team (including palliative care or pain specialists if applicable)  Outcome: Progressing     Problem: Safety - Medical Restraint  Goal: Remains free of injury from restraints (Restraint for Interference with Medical Device)  Description: INTERVENTIONS:  1. Determine that other, less  restrictive measures have been tried or would not be effective before applying the restraint  2. Evaluate the patient's condition at the time of restraint application  3. Educate patient/family regarding the reason for restraint  4. Q2H: Monitor safety, psychosocial status, comfort, circulation, respiratory status, LOC, nutrition and hydration  Outcome: Progressing  Goal: Free from restraint(s) (Restraint for Interference with Medical Device)  Description: INTERVENTIONS:  1.  ONCE/SHIFT or MINIMUM Q12H: Assess and document the continuing need for restraints  2.  Q24H: Continued use of restraint requires LIP to perform face to face examination and written order  3.  Identify and implement measures to help patient regain control  4.  Educate patient/family on discontinuation criteria   5.  Assess patient's understanding and retention of education provided  6.  Assess readiness for release & initiate progressive release per protocol  7.  Identify and document criteria for restraints  Outcome: Progressing       Patient is not progressing towards the following goals:

## 2025-07-30 ENCOUNTER — APPOINTMENT (OUTPATIENT)
Dept: RADIOLOGY | Facility: MEDICAL CENTER | Age: 65
DRG: 957 | End: 2025-07-30
Attending: STUDENT IN AN ORGANIZED HEALTH CARE EDUCATION/TRAINING PROGRAM
Payer: COMMERCIAL

## 2025-07-30 ENCOUNTER — APPOINTMENT (OUTPATIENT)
Dept: RADIOLOGY | Facility: MEDICAL CENTER | Age: 65
DRG: 957 | End: 2025-07-30
Payer: COMMERCIAL

## 2025-07-30 PROCEDURE — 71045 X-RAY EXAM CHEST 1 VIEW: CPT

## 2025-07-30 ASSESSMENT — PAIN DESCRIPTION - PAIN TYPE
TYPE: ACUTE PAIN

## 2025-07-30 NOTE — PROGRESS NOTES
[place] replacement pads placed at pt's bedside.    Contact traction with any questions or concerns regarding the brace in use.

## 2025-07-30 NOTE — RESPIRATORY CARE
Extubation    Cuff leak noted yes  Stridor present no     FiO2%: 50 % (07/30/25 1000)  O2 (LPM): 4 (07/30/25 1125)     Patient toleration well  RCP Complete? yes  Events/Summary/Plan: extubation (07/30/25 1125)

## 2025-07-30 NOTE — CARE PLAN
The patient is Watcher - Medium risk of patient condition declining or worsening    Shift Goals  Clinical Goals: pulm hygiene, monitor N/V, pain management  Patient Goals: DYLAN  Family Goals: DYLAN    Progress made toward(s) clinical / shift goals:    Problem: Skin Integrity  Goal: Skin integrity is maintained or improved  Outcome: Progressing     Problem: Pain - Standard  Goal: Alleviation of pain or a reduction in pain to the patient’s comfort goal  Outcome: Progressing     Problem: Safety - Medical Restraint  Goal: Free from restraint(s) (Restraint for Interference with Medical Device)  Outcome: Progressing       Patient is not progressing towards the following goals:

## 2025-07-30 NOTE — THERAPY
Physical Therapy Contact Note    Patient Name: Jairo Abreu  Age:  65 y.o., Sex:  person  Medical Record #: [id number]  Today's Date: 7/30/2025    PT orders received. Per chart, Hgb 6.5 likely pending transfusion. Will hold until post transfusion as appropriate    Christopher Lopez PT, DPT

## 2025-07-30 NOTE — PROGRESS NOTES
"      Orthopaedic Progress Note    Interval changes:  Patient doing well   RLE dressings changed incisions without issues   Non op distal fibula fx- in cam boot at all times   Cleared for DC to rehab by ortho pending trauma clearance    ROS - Patient denies any new issues.  Pain well controlled.    BP (!) 171/77   Pulse 95   Temp 37.6 °C (99.7 °F) (Bladder)   Resp (!) 23   Ht 1.803 m (5' 10.98\")   Wt 80.1 kg (176 lb 9.4 oz)   SpO2 100%     Patient seen and examined  No acute distress  Breathing non labored  RRR  RLE dressings changed incisions without issues, DNVI, moves all toes, cap refill <2 sec.     Recent Labs     07/28/25  0425 07/29/25  0411 07/30/25  0355   WBC 6.9 9.4 8.2   RBC 2.00* 2.29* 2.06*   HEMOGLOBIN 6.3* 7.0* 6.5*   HEMATOCRIT 18.8* 21.2* 19.4*   MCV 94.0 92.6 94.2   MCH 31.5 30.6 31.6   MCHC 33.5 33.0 33.5   RDW 47.7 50.2* 50.3*   PLATELETCT 162* 184 218   MPV 10.5 10.8 10.7       Active Hospital Problems    Diagnosis     Pneumothorax, right [J93.9]      Priority: High    Acute respiratory failure following trauma with hypoventilation and hypoxia [J96.90]      Priority: High    Grade III injury of left kidney [S37.002A]      Priority: High    Closed fracture of transverse process of cervical vertebra (HCC) [S12.9XXA]      Priority: High    Multiple fractures of ribs, bilateral, initial encounter for closed fracture [S22.43XA]      Priority: High    Pneumothorax, left [J93.9]      Priority: High    Acute blood loss anemia [D62]      Priority: Medium    Open fracture of right tibia and fibula [S82.201B, S82.401B]      Priority: Medium    Nasal bone fracture [S02.2XXA]      Priority: Medium    Pneumoperitoneum [K66.8]      Priority: Medium    No contraindication to deep vein thrombosis (DVT) prophylaxis [Z78.9]      Priority: Medium    Adrenal insufficiency (HCC) [E27.40]      Priority: Medium    Trauma [T14.90XA]      Priority: Low    Closed fracture of distal end of left fibula [Y69.358K]     "  Priority: Low       Assessment/Plan:  Patient doing well   RLE dressings changed incisions without issues   Non op distal fibula fx- in cam boot at all times   Cleared for DC to rehab by ortho pending trauma clearance  POD#7 S/P Right tibial nailing  Wt bearing status - NWB RLE, WBAT LLE  Wound care/Drains - Dressings to be changed every other day by nursing. Or PRN for saturation    Future Procedures - none planned   Lovenox: Start 7/24, Duration-until ambulatory > 150'  Sutures/Staples out- 14-21 days post operatively. Removal by ortho mid levels only.  PT/OT-initiated  Antibiotics: maxipime 2g IV Q8  DVT Prophylaxis- TEDS/SCDs/Foot pumps  Wilson-not needed per ortho  Case Coordination for Discharge Planning - Disposition per therapy recs.

## 2025-07-30 NOTE — CARE PLAN
The patient is Watcher - Medium risk of patient condition declining or worsening    Shift Goals  Clinical Goals: pulm hygiene, monitor N/V, pain management  Patient Goals: DYLAN  Family Goals: Updates    Progress made toward(s) clinical / shift goals:    Problem: Pain - Standard  Goal: Alleviation of pain or a reduction in pain to the patient’s comfort goal  Outcome: Progressing     Problem: Safety - Medical Restraint  Goal: Remains free of injury from restraints (Restraint for Interference with Medical Device)  Outcome: Progressing       Patient is not progressing towards the following goals:

## 2025-07-30 NOTE — CARE PLAN
The patient is Watcher - Medium risk of patient condition declining or worsening    Shift Goals  Clinical Goals: pulm hygiene, monitor N/V, pain management  Patient Goals: DYLAN  Family Goals: DYLAN    Progress made toward(s) clinical / shift goals:    Problem: Knowledge Deficit - Standard  Goal: Patient and family/care givers will demonstrate understanding of plan of care, disease process/condition, diagnostic tests and medications  Description: Target End Date:  1-3 days or as soon as patient condition allows    Document in Patient Education    1.  Patient and family/caregiver oriented to unit, equipment, visitation policy and means for communicating concern  2.  Complete/review Learning Assessment  3.  Assess knowledge level of disease process/condition, treatment plan, diagnostic tests and medications  4.  Explain disease process/condition, treatment plan, diagnostic tests and medications  Outcome: Progressing     Problem: Pain - Standard  Goal: Alleviation of pain or a reduction in pain to the patient’s comfort goal  Description: Target End Date:  Prior to discharge or change in level of care    Document on Vitals flowsheet    1.  Document pain using the appropriate pain scale per order or unit policy  2.  Educate and implement non-pharmacologic comfort measures (i.e. relaxation, distraction, massage, cold/heat therapy, etc.)  3.  Pain management medications as ordered  4.  Reassess pain after pain med administration per policy  5.  If opiods administered assess patient's response to pain medication is appropriate per POSS sedation scale  6.  Follow pain management plan developed in collaboration with patient and interdisciplinary team (including palliative care or pain specialists if applicable)  Outcome: Progressing     Problem: Safety - Medical Restraint  Goal: Remains free of injury from restraints (Restraint for Interference with Medical Device)  Description: INTERVENTIONS:  1. Determine that other, less  restrictive measures have been tried or would not be effective before applying the restraint  2. Evaluate the patient's condition at the time of restraint application  3. Educate patient/family regarding the reason for restraint  4. Q2H: Monitor safety, psychosocial status, comfort, circulation, respiratory status, LOC, nutrition and hydration  Outcome: Progressing  Goal: Free from restraint(s) (Restraint for Interference with Medical Device)  Description: INTERVENTIONS:  1.  ONCE/SHIFT or MINIMUM Q12H: Assess and document the continuing need for restraints  2.  Q24H: Continued use of restraint requires LIP to perform face to face examination and written order  3.  Identify and implement measures to help patient regain control  4.  Educate patient/family on discontinuation criteria   5.  Assess patient's understanding and retention of education provided  6.  Assess readiness for release & initiate progressive release per protocol  7.  Identify and document criteria for restraints  Outcome: Progressing       Patient is not progressing towards the following goals:
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
## 2025-07-31 ENCOUNTER — APPOINTMENT (OUTPATIENT)
Dept: RADIOLOGY | Facility: MEDICAL CENTER | Age: 65
DRG: 957 | End: 2025-07-31
Payer: COMMERCIAL

## 2025-07-31 ENCOUNTER — APPOINTMENT (OUTPATIENT)
Dept: RADIOLOGY | Facility: MEDICAL CENTER | Age: 65
DRG: 957 | End: 2025-07-31
Attending: NURSE PRACTITIONER
Payer: COMMERCIAL

## 2025-07-31 ENCOUNTER — APPOINTMENT (OUTPATIENT)
Dept: RADIOLOGY | Facility: MEDICAL CENTER | Age: 65
End: 2025-07-31
Attending: STUDENT IN AN ORGANIZED HEALTH CARE EDUCATION/TRAINING PROGRAM
Payer: OTHER MISCELLANEOUS

## 2025-07-31 PROCEDURE — 93970 EXTREMITY STUDY: CPT | Mod: 26 | Performed by: INTERNAL MEDICINE

## 2025-07-31 PROCEDURE — 93970 EXTREMITY STUDY: CPT

## 2025-07-31 PROCEDURE — 71045 X-RAY EXAM CHEST 1 VIEW: CPT

## 2025-07-31 ASSESSMENT — COGNITIVE AND FUNCTIONAL STATUS - GENERAL
HELP NEEDED FOR BATHING: A LOT
SUGGESTED CMS G CODE MODIFIER MOBILITY: CL
DRESSING REGULAR UPPER BODY CLOTHING: A LITTLE
MOVING TO AND FROM BED TO CHAIR: A LOT
MOBILITY SCORE: 10
MOVING FROM LYING ON BACK TO SITTING ON SIDE OF FLAT BED: A LOT
DRESSING REGULAR LOWER BODY CLOTHING: A LOT
WALKING IN HOSPITAL ROOM: TOTAL
SUGGESTED CMS G CODE MODIFIER DAILY ACTIVITY: CK
DAILY ACTIVITIY SCORE: 17
TOILETING: A LOT
CLIMB 3 TO 5 STEPS WITH RAILING: TOTAL
STANDING UP FROM CHAIR USING ARMS: A LOT
TURNING FROM BACK TO SIDE WHILE IN FLAT BAD: A LOT

## 2025-07-31 ASSESSMENT — PAIN DESCRIPTION - PAIN TYPE
TYPE: ACUTE PAIN

## 2025-07-31 ASSESSMENT — ACTIVITIES OF DAILY LIVING (ADL): TOILETING: INDEPENDENT

## 2025-07-31 ASSESSMENT — GAIT ASSESSMENTS: GAIT LEVEL OF ASSIST: UNABLE TO PARTICIPATE
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
## 2025-07-31 NOTE — CARE PLAN
The patient is Watcher - Medium risk of patient condition declining or worsening    Shift Goals  Clinical Goals: pulmonary hygiene, pain control, mobility  Patient Goals: comfort  Family Goals: DYLAN    Progress made toward(s) clinical / shift goals:    Problem: Knowledge Deficit - Standard  Goal: Patient and family/care givers will demonstrate understanding of plan of care, disease process/condition, diagnostic tests and medications  Outcome: Progressing     Problem: Pain - Standard  Goal: Alleviation of pain or a reduction in pain to the patient’s comfort goal  Outcome: Progressing       Patient is not progressing towards the following goals:
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
## 2025-07-31 NOTE — PROGRESS NOTES
Patient declining blood transfusion at this time, would like to hold off to see self improves. Patient and sister (Kika) educated on the need for blood transfusion given continued output from chest tubes. Dr. Rick aware, hemodynamics stable at this time.

## 2025-07-31 NOTE — CARE PLAN
The patient is Stable - Low risk of patient condition declining or worsening    Shift Goals  Clinical Goals: pulmonary hygiene, pain control, mobility  Patient Goals: comfort  Family Goals: DYLAN    Progress made toward(s) clinical / shift goals:    Problem: Knowledge Deficit - Standard  Goal: Patient and family/care givers will demonstrate understanding of plan of care, disease process/condition, diagnostic tests and medications  Description: Target End Date:  1-3 days or as soon as patient condition allows    Document in Patient Education    1.  Patient and family/caregiver oriented to unit, equipment, visitation policy and means for communicating concern  2.  Complete/review Learning Assessment  3.  Assess knowledge level of disease process/condition, treatment plan, diagnostic tests and medications  4.  Explain disease process/condition, treatment plan, diagnostic tests and medications  Outcome: Progressing     Problem: Skin Integrity  Goal: Skin integrity is maintained or improved  Description: Target End Date:  Prior to discharge or change in level of care    Document interventions on Skin Risk/Kamar flowsheet groups and corresponding LDA    1.  Assess and monitor skin integrity, appearance and/or temperature  2.  Assess risk factors for impaired skin integrity and/or pressures ulcers  3.  Implement precautions to protect skin integrity in collaboration with interdisciplinary team  4.  Implement pressure ulcer prevention protocol if at risk for skin breakdown  5.  Confirm wound care consult if at risk for skin breakdown  6.  Ensure patient use of pressure relieving devices  (Low air loss bed, waffle overlay, heel protectors, ROHO cushion, etc)  Outcome: Progressing     Problem: Fall Risk  Goal: Patient will remain free from falls  Description: Target End Date:  Prior to discharge or change in level of care    Document interventions on the Chioma Swartz Fall Risk Assessment    1.  Assess for fall risk factors  2.   Implement fall precautions  Outcome: Progressing     Problem: Pain - Standard  Goal: Alleviation of pain or a reduction in pain to the patient’s comfort goal  Description: Target End Date:  Prior to discharge or change in level of care    Document on Vitals flowsheet    1.  Document pain using the appropriate pain scale per order or unit policy  2.  Educate and implement non-pharmacologic comfort measures (i.e. relaxation, distraction, massage, cold/heat therapy, etc.)  3.  Pain management medications as ordered  4.  Reassess pain after pain med administration per policy  5.  If opiods administered assess patient's response to pain medication is appropriate per POSS sedation scale  6.  Follow pain management plan developed in collaboration with patient and interdisciplinary team (including palliative care or pain specialists if applicable)  Outcome: Progressing
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
## 2025-07-31 NOTE — DISCHARGE PLANNING
Case Management Discharge Planning    Admission Date: 7/23/2025  GMLOS: 9.8  ALOS: 8    6-Clicks ADL Score: 17  6-Clicks Mobility Score: 10  PT and/or OT Eval ordered: Yes  Post-acute Referrals Ordered: Yes  Post-acute Choice Obtained: No  Has referral(s) been sent to post-acute provider:  Yes      Anticipated Discharge Dispo: Discharge Disposition: Disch to a long term care facility (64)    DME Needed: No    Action(s) Taken: Extubated, no longer meeting LTACH criteria. [facility] referrals to [place] and surrounding areas sent.Updated Provider/Nurse on Discharge Plan and Referral(s) sent    Escalations Completed: None    Medically Clear: No    Next Steps: Follow up on referrals sent    Barriers to Discharge: Medical clearance, Pending Placement, and Pending Procedures    Is the patient up for discharge tomorrow: No

## 2025-08-01 ENCOUNTER — APPOINTMENT (OUTPATIENT)
Dept: RADIOLOGY | Facility: MEDICAL CENTER | Age: 65
DRG: 957 | End: 2025-08-01
Payer: COMMERCIAL

## 2025-08-01 PROCEDURE — 71045 X-RAY EXAM CHEST 1 VIEW: CPT

## 2025-08-01 ASSESSMENT — PAIN DESCRIPTION - PAIN TYPE
TYPE: ACUTE PAIN
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
## 2025-08-01 NOTE — CARE PLAN
The patient is Stable - Low risk of patient condition declining or worsening    Shift Goals  Clinical Goals: mobility, pulmonary hygiene  Patient Goals: pain control  Family Goals: DYLAN    Progress made toward(s) clinical / shift goals:    Problem: Knowledge Deficit - Standard  Goal: Patient and family/care givers will demonstrate understanding of plan of care, disease process/condition, diagnostic tests and medications  Description: Target End Date:  1-3 days or as soon as patient condition allows    Document in Patient Education    1.  Patient and family/caregiver oriented to unit, equipment, visitation policy and means for communicating concern  2.  Complete/review Learning Assessment  3.  Assess knowledge level of disease process/condition, treatment plan, diagnostic tests and medications  4.  Explain disease process/condition, treatment plan, diagnostic tests and medications  Outcome: Progressing     Problem: Skin Integrity  Goal: Skin integrity is maintained or improved  Description: Target End Date:  Prior to discharge or change in level of care    Document interventions on Skin Risk/Kamar flowsheet groups and corresponding LDA    1.  Assess and monitor skin integrity, appearance and/or temperature  2.  Assess risk factors for impaired skin integrity and/or pressures ulcers  3.  Implement precautions to protect skin integrity in collaboration with interdisciplinary team  4.  Implement pressure ulcer prevention protocol if at risk for skin breakdown  5.  Confirm wound care consult if at risk for skin breakdown  6.  Ensure patient use of pressure relieving devices  (Low air loss bed, waffle overlay, heel protectors, ROHO cushion, etc)  Outcome: Progressing     Problem: Fall Risk  Goal: Patient will remain free from falls  Description: Target End Date:  Prior to discharge or change in level of care    Document interventions on the Chioma Swartz Fall Risk Assessment    1.  Assess for fall risk factors  2.   Implement fall precautions  Outcome: Progressing     Problem: Pain - Standard  Goal: Alleviation of pain or a reduction in pain to the patient’s comfort goal  Description: Target End Date:  Prior to discharge or change in level of care    Document on Vitals flowsheet    1.  Document pain using the appropriate pain scale per order or unit policy  2.  Educate and implement non-pharmacologic comfort measures (i.e. relaxation, distraction, massage, cold/heat therapy, etc.)  3.  Pain management medications as ordered  4.  Reassess pain after pain med administration per policy  5.  If opiods administered assess patient's response to pain medication is appropriate per POSS sedation scale  6.  Follow pain management plan developed in collaboration with patient and interdisciplinary team (including palliative care or pain specialists if applicable)  Outcome: Progressing            Right lower quadrant pain
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
## 2025-08-01 NOTE — DISCHARGE PLANNING
Case Management Discharge Planning    Admission Date: 7/23/2025  GMLOS: 9.8  ALOS: 9    6-Clicks ADL Score: 17  6-Clicks Mobility Score: 10  PT and/or OT Eval ordered: Yes  Post-acute Referrals Ordered: Yes  Post-acute Choice Obtained: No  Has referral(s) been sent to post-acute provider:  Yes      Anticipated Discharge Dispo: Discharge Disposition: Disch to a long term care facility (64)    DME Needed: No    Action(s) Taken: Updated Provider/Nurse on Discharge Plan, Referral(s) sent, and Completed PASSR/LOC    PASRR  [id number]  [id number]     Escalations Completed: None    Medically Clear: No    Next Steps: Follow up with [facility]    Barriers to Discharge: Medical clearance, Pending Placement, No Social Support, Homelessness, and Pending Procedures    Is the patient up for discharge tomorrow: No

## 2025-08-02 ENCOUNTER — APPOINTMENT (OUTPATIENT)
Dept: RADIOLOGY | Facility: MEDICAL CENTER | Age: 65
DRG: 957 | End: 2025-08-02
Payer: COMMERCIAL

## 2025-08-02 PROCEDURE — 71045 X-RAY EXAM CHEST 1 VIEW: CPT

## 2025-08-02 ASSESSMENT — ENCOUNTER SYMPTOMS
VOMITING: 0
WEIGHT LOSS: 0
ROS GI COMMENTS: BM 7/30
DIARRHEA: 0
ABDOMINAL PAIN: 0
CARDIOVASCULAR NEGATIVE: 1
MYALGIAS: 1
RESPIRATORY NEGATIVE: 1
NECK PAIN: 1
NAUSEA: 0
NEUROLOGICAL NEGATIVE: 1

## 2025-08-02 ASSESSMENT — PAIN DESCRIPTION - PAIN TYPE
TYPE: ACUTE PAIN
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
## 2025-08-02 NOTE — PROGRESS NOTES
"      Orthopaedic Progress Note    Interval changes:  Patient doing well   RLE dressings CDI  Non op distal fibula fx- in cam boot when mobilizing   Cleared for DC to rehab by ortho pending trauma clearance    ROS - Patient denies any new issues.  Pain well controlled.    /71   Pulse 93   Temp 36.1 °C (97 °F) (Temporal)   Resp (!) 52   Ht 1.803 m (5' 10.98\")   Wt 80.1 kg (176 lb 9.4 oz)   SpO2 96%     Patient seen and examined  No acute distress  Breathing non labored  RRR  RLE dressings CDI, DNVI, moves all toes, cap refill <2 sec.     Recent Labs     07/31/25  0430 08/01/25  0615 08/02/25  0535   WBC 8.4 13.3* 15.7*   RBC 2.19* 3.05* 2.92*   HEMOGLOBIN 6.7* 9.3* 9.0*   HEMATOCRIT 20.9* 28.5* 28.0*   MCV 95.4 93.4 95.9   MCH 30.6 30.5 30.8   MCHC 32.1* 32.6 32.1*   RDW 48.6 49.9 50.4*   PLATELETCT 313 420 433   MPV 9.9 9.9 9.7       Active Hospital Problems    Diagnosis     Pneumothorax, right [J93.9]      Priority: High    Acute respiratory failure following trauma with hypoventilation and hypoxia [J96.90]      Priority: High    Grade III injury of left kidney [S37.002A]      Priority: High    Closed fracture of transverse process of cervical vertebra (HCC) [S12.9XXA]      Priority: High    Multiple fractures of ribs, bilateral, initial encounter for closed fracture [S22.43XA]      Priority: High    Acute blood loss anemia [D62]      Priority: Medium    Open fracture of right tibia and fibula [S82.201B, S82.401B]      Priority: Medium    Pneumoperitoneum [K66.8]      Priority: Medium    No contraindication to deep vein thrombosis (DVT) prophylaxis [Z78.9]      Priority: Medium    Adrenal insufficiency (HCC) [E27.40]      Priority: Medium    Trauma [T14.90XA]      Priority: Low    Nasal bone fracture [S02.2XXA]      Priority: Low    Closed fracture of distal end of left fibula [S82.832A]      Priority: Low    Pneumothorax, left [J93.9]      Priority: Low       Assessment/Plan:  Patient doing well   RLE " dressings CDI  Non op distal fibula fx- in cam boot when mobilizing   Cleared for DC to rehab by ortho pending trauma clearance  POD#10 S/P Right tibial nailing  Wt bearing status - NWB RLE, WBAT LLE  Wound care/Drains - Dressings to be changed every other day by nursing. Or PRN for saturation    Future Procedures - none planned   Lovenox: Start 7/24, Duration-until ambulatory > 150'  Sutures/Staples out- 14-21 days post operatively. Removal by ortho mid levels only.  PT/OT-initiated  Antibiotics: completed  DVT Prophylaxis- TEDS/SCDs/Foot pumps  Wilson-not needed per ortho  Case Coordination for Discharge Planning - Disposition per therapy recs.

## 2025-08-02 NOTE — CARE PLAN
"The patient is Watcher - Medium risk of patient condition declining or worsening    Shift Goals  Clinical Goals: pulmonary hygiene  Patient Goals: \"be left alone\"  Family Goals: No family present    Progress made toward(s) clinical / shift goals:      Problem: Knowledge Deficit - Standard  Goal: Patient and family/care givers will demonstrate understanding of plan of care, disease process/condition, diagnostic tests and medications  Outcome: Progressing     Problem: Skin Integrity  Goal: Skin integrity is maintained or improved  Outcome: Progressing     Problem: Fall Risk  Goal: Patient will remain free from falls  Outcome: Progressing     Problem: Pain - Standard  Goal: Alleviation of pain or a reduction in pain to the patient’s comfort goal  Outcome: Progressing     "

## 2025-08-02 NOTE — CARE PLAN
The patient is Stable - Low risk of patient condition declining or worsening    Shift Goals  Clinical Goals: pulmonary hygiene, mobility, rest  Patient Goals: rest  Family Goals: no family present    Progress made toward(s) clinical / shift goals:    Problem: Knowledge Deficit - Standard  Goal: Patient and family/care givers will demonstrate understanding of plan of care, disease process/condition, diagnostic tests and medications  Outcome: Progressing     Problem: Skin Integrity  Goal: Skin integrity is maintained or improved  Outcome: Progressing     Problem: Fall Risk  Goal: Patient will remain free from falls  Outcome: Progressing     Problem: Pain - Standard  Goal: Alleviation of pain or a reduction in pain to the patient’s comfort goal  Outcome: Progressing

## 2025-08-02 NOTE — CARE PLAN
Problem: Hyperinflation  Goal: Prevent or improve atelectasis  Description: Target End Date:  3 to 4 days    1. Instruct incentive spirometry usage  2.  Perform hyperinflation therapy as indicated  Outcome: Progressing   PEP BID  Achieving 1250 ml on IS. Receiving 0.5L via nasal cannula.

## 2025-08-03 ENCOUNTER — APPOINTMENT (OUTPATIENT)
Dept: RADIOLOGY | Facility: MEDICAL CENTER | Age: 65
DRG: 957 | End: 2025-08-03
Attending: NURSE PRACTITIONER
Payer: COMMERCIAL

## 2025-08-03 ENCOUNTER — APPOINTMENT (OUTPATIENT)
Dept: RADIOLOGY | Facility: MEDICAL CENTER | Age: 65
DRG: 957 | End: 2025-08-03
Payer: COMMERCIAL

## 2025-08-03 PROBLEM — E27.40 ADRENAL INSUFFICIENCY (HCC): Status: RESOLVED | Noted: 2025-07-23 | Resolved: 2025-08-03

## 2025-08-03 PROCEDURE — 71045 X-RAY EXAM CHEST 1 VIEW: CPT | Performed by: RADIOLOGY

## 2025-08-03 PROCEDURE — 71045 X-RAY EXAM CHEST 1 VIEW: CPT

## 2025-08-03 ASSESSMENT — PAIN DESCRIPTION - PAIN TYPE
TYPE: ACUTE PAIN

## 2025-08-03 ASSESSMENT — FIBROSIS 4 INDEX: FIB4 SCORE: 2.37

## 2025-08-03 NOTE — CARE PLAN
The patient is Stable - Low risk of patient condition declining or worsening    Shift Goals  Clinical Goals: Pulmonary hygeine  Patient Goals: Patient silent when asked for goals for the day.  Family Goals: Family not present    Progress made toward(s) clinical / shift goals:    Problem: Knowledge Deficit - Standard  Goal: Patient and family/care givers will demonstrate understanding of plan of care, disease process/condition, diagnostic tests and medications  Outcome: Progressing     Problem: Skin Integrity  Goal: Skin integrity is maintained or improved  Outcome: Progressing     Problem: Fall Risk  Goal: Patient will remain free from falls  Outcome: Progressing     Problem: Pain - Standard  Goal: Alleviation of pain or a reduction in pain to the patient’s comfort goal  Outcome: Progressing

## 2025-08-03 NOTE — PROGRESS NOTES
"      Orthopaedic Progress Note    Interval changes:  Patient doing well   RLE dressings CDI  Non op distal fibula fx- in cam boot when mobilizing   Cleared for DC to rehab by ortho pending trauma clearance    ROS - Patient denies any new issues.  Pain well controlled.    /62   Pulse 81   Temp 36.4 °C (97.6 °F) (Temporal)   Resp 16   Ht 1.803 m (5' 10.98\")   Wt 70.6 kg (155 lb 10.3 oz)   SpO2 90%     Patient seen and examined  No acute distress  Breathing non labored  RRR  RLE dressings CDI, DNVI, moves all toes, cap refill <2 sec.     Recent Labs     08/01/25  0615 08/02/25  0535 08/03/25  0400   WBC 13.3* 15.7* 15.3*   RBC 3.05* 2.92* 2.73*   HEMOGLOBIN 9.3* 9.0* 8.5*   HEMATOCRIT 28.5* 28.0* 25.3*   MCV 93.4 95.9 92.7   MCH 30.5 30.8 31.1   MCHC 32.6 32.1* 33.6   RDW 49.9 50.4* 49.1   PLATELETCT 420 433 492*   MPV 9.9 9.7 10.1       Active Hospital Problems    Diagnosis     Pneumothorax, right [J93.9]      Priority: High    Grade III injury of left kidney [S37.002A]      Priority: High    Closed fracture of transverse process of cervical vertebra (HCC) [S12.9XXA]      Priority: High    Multiple fractures of ribs, bilateral, initial encounter for closed fracture [S22.43XA]      Priority: High    Acute blood loss anemia [D62]      Priority: Medium    Acute respiratory failure following trauma with hypoventilation and hypoxia [J96.90]      Priority: Medium    Open fracture of right tibia and fibula [S82.201B, S82.401B]      Priority: Medium    Pneumoperitoneum [K66.8]      Priority: Medium    No contraindication to deep vein thrombosis (DVT) prophylaxis [Z78.9]      Priority: Medium    Trauma [T14.90XA]      Priority: Low    Nasal bone fracture [S02.2XXA]      Priority: Low    Closed fracture of distal end of left fibula [S82.832A]      Priority: Low    Pneumothorax, left [J93.9]      Priority: Low       Assessment/Plan:  Patient doing well   RLE dressings CDI  Non op distal fibula fx- in cam boot when " mobilizing   Cleared for DC to rehab by ortho pending trauma clearance  POD#11 S/P Right tibial nailing  Wt bearing status - NWB RLE, WBAT LLE  Wound care/Drains - Dressings to be changed every other day by nursing. Or PRN for saturation    Future Procedures - none planned   Lovenox: Start 7/24, Duration-until ambulatory > 150'  Sutures/Staples out- 14-21 days post operatively. Removal by ortho mid levels only.  PT/OT-initiated  Antibiotics: completed  DVT Prophylaxis- TEDS/SCDs/Foot pumps  Wilson-not needed per ortho  Case Coordination for Discharge Planning - Disposition per therapy recs.

## 2025-08-03 NOTE — CARE PLAN
The patient is Watcher - Medium risk of patient condition declining or worsening    Shift Goals  Clinical Goals: pulmonary hygiene, pain management, rest  Patient Goals: sleep comfortably  Family Goals: DYLAN    Progress made toward(s) clinical / shift goals:    Problem: Knowledge Deficit - Standard  Goal: Patient and family/care givers will demonstrate understanding of plan of care, disease process/condition, diagnostic tests and medications  Outcome: Progressing     Problem: Skin Integrity  Goal: Skin integrity is maintained or improved  Outcome: Progressing     Problem: Fall Risk  Goal: Patient will remain free from falls  Outcome: Progressing     Problem: Pain - Standard  Goal: Alleviation of pain or a reduction in pain to the patient’s comfort goal  Outcome: Progressing

## 2025-08-03 NOTE — CARE PLAN
The patient is Stable - Low risk of patient condition declining or worsening    Shift Goals  Clinical Goals: pulmonary hygiene  Patient Goals: rest  Family Goals: no family present    Progress made toward(s) clinical / shift goals:      Problem: Knowledge Deficit - Standard  Goal: Patient and family/care givers will demonstrate understanding of plan of care, disease process/condition, diagnostic tests and medications  Outcome: Progressing     Problem: Skin Integrity  Goal: Skin integrity is maintained or improved  Outcome: Progressing     Problem: Fall Risk  Goal: Patient will remain free from falls  Outcome: Progressing

## 2025-08-04 ENCOUNTER — APPOINTMENT (OUTPATIENT)
Dept: RADIOLOGY | Facility: MEDICAL CENTER | Age: 65
DRG: 957 | End: 2025-08-04
Payer: COMMERCIAL

## 2025-08-04 PROBLEM — J96.90 RESPIRATORY FAILURE FOLLOWING TRAUMA (HCC): Status: RESOLVED | Noted: 2025-07-23 | Resolved: 2025-08-04

## 2025-08-04 PROBLEM — G47.00 INSOMNIA: Status: ACTIVE | Noted: 2025-08-04

## 2025-08-04 PROBLEM — D72.829 LEUKOCYTOSIS: Status: ACTIVE | Noted: 2025-08-04

## 2025-08-04 PROBLEM — J93.9 PNEUMOTHORAX, LEFT: Status: RESOLVED | Noted: 2025-07-23 | Resolved: 2025-08-04

## 2025-08-04 PROCEDURE — 71045 X-RAY EXAM CHEST 1 VIEW: CPT

## 2025-08-04 ASSESSMENT — PAIN DESCRIPTION - PAIN TYPE
TYPE: ACUTE PAIN

## 2025-08-04 ASSESSMENT — FIBROSIS 4 INDEX: FIB4 SCORE: 2.37

## 2025-08-04 ASSESSMENT — ENCOUNTER SYMPTOMS
HEADACHES: 0
DIZZINESS: 0
SHORTNESS OF BREATH: 0
ROS GI COMMENTS: BM 8/3
COUGH: 0
ABDOMINAL PAIN: 0
VOMITING: 0
NAUSEA: 0
SENSORY CHANGE: 0
NECK PAIN: 1
MYALGIAS: 1
TINGLING: 1
DOUBLE VISION: 1

## 2025-08-04 NOTE — PROGRESS NOTES
"      Orthopaedic Progress Note    Interval changes:  Patient doing well   RLE dressings CDI  Non op distal fibula fx- in cam boot when mobilizing   Cleared for DC to rehab by ortho pending trauma clearance    ROS - Patient denies any new issues.  Pain well controlled.    /69   Pulse 81   Temp 36.4 °C (97.5 °F) (Temporal)   Resp (!) 27   Ht 1.803 m (5' 10.98\")   Wt 70.9 kg (156 lb 4.9 oz)   SpO2 96%     Patient seen and examined  No acute distress  Breathing non labored  RRR  RLE dressings CDI, DNVI, moves all toes, cap refill <2 sec.     Recent Labs     08/02/25  0535 08/03/25  0400 08/04/25  0228   WBC 15.7* 15.3* 15.5*   RBC 2.92* 2.73* 2.77*   HEMOGLOBIN 9.0* 8.5* 8.6*   HEMATOCRIT 28.0* 25.3* 26.4*   MCV 95.9 92.7 95.3   MCH 30.8 31.1 31.0   MCHC 32.1* 33.6 32.6   RDW 50.4* 49.1 51.1*   PLATELETCT 433 492* 561*   MPV 9.7 10.1 9.9       Active Hospital Problems    Diagnosis     Pneumothorax, right [J93.9]      Priority: High    Grade III injury of left kidney [S37.002A]      Priority: High    Closed fracture of transverse process of cervical vertebra (HCC) [S12.9XXA]      Priority: High    Multiple fractures of ribs, bilateral, initial encounter for closed fracture [S22.43XA]      Priority: High    Leukocytosis [D72.829]      Priority: Medium    Acute blood loss anemia [D62]      Priority: Medium    Open fracture of right tibia and fibula [S82.201B, S82.401B]      Priority: Medium    Pneumoperitoneum [K66.8]      Priority: Medium    Closed fracture of distal end of left fibula [S82.832A]      Priority: Medium    Insomnia [G47.00]      Priority: Low    Trauma [T14.90XA]      Priority: Low    Nasal bone fracture [S02.2XXA]      Priority: Low    No contraindication to deep vein thrombosis (DVT) prophylaxis [Z78.9]      Priority: Low       Assessment/Plan:  Patient doing well   RLE dressings CDI  Non op distal fibula fx- in cam boot when mobilizing   Cleared for DC to rehab by ortho pending trauma " clearance  POD#12 S/P Right tibial nailing  Wt bearing status - NWB RLE, WBAT LLE  Wound care/Drains - Dressings to be changed every other day by nursing. Or PRN for saturation    Future Procedures - none planned   Lovenox: Start 7/24, Duration-until ambulatory > 150'  Sutures/Staples out- 14-21 days post operatively. Removal by ortho mid levels only.  PT/OT-initiated  Antibiotics: completed  DVT Prophylaxis- TEDS/SCDs/Foot pumps  Wilson-not needed per ortho  Case Coordination for Discharge Planning - Disposition per therapy recs.
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
## 2025-08-04 NOTE — DISCHARGE PLANNING
Case Management Discharge Planning    Admission Date: 7/23/2025  GMLOS: 9.8  ALOS: 12    6-Clicks ADL Score: 17  6-Clicks Mobility Score: 10  PT and/or OT Eval ordered: Yes  Post-acute Referrals Ordered: Yes  Post-acute Choice Obtained: No  Has referral(s) been sent to post-acute provider:  Yes      Anticipated Discharge Dispo: Discharge Disposition: D    DME Needed: No    Action(s) Taken:Chest tube removal today, on RA. No accepting facilities. Updated Provider/Nurse on Discharge Plan  Expanded area for SNF referrals.     Escalations Completed: None    Medically Clear: No    Next Steps: Follow up with SNF referrals    Barriers to Discharge: Medical clearance and Pending Placement    Is the patient up for discharge tomorrow: No

## 2025-08-04 NOTE — CARE PLAN
The patient is Watcher - Medium risk of patient condition declining or worsening    Shift Goals  Clinical Goals: Safety; Pulmonary hygeine, hemodynamics  Patient Goals: Pain control; rest  Family Goals: DYLAN    Progress made toward(s) clinical / shift goals:      Problem: Knowledge Deficit - Standard  Goal: Patient and family/care givers will demonstrate understanding of plan of care, disease process/condition, diagnostic tests and medications  Outcome: Progressing    Problem: Fall Risk  Goal: Patient will remain free from falls  Outcome: Progressing     Problem: Skin Integrity  Goal: Skin integrity is maintained or improved  Outcome: Progressing     Problem: Pain - Standard  Goal: Alleviation of pain or a reduction in pain to the patient’s comfort goal  Outcome: Progressing
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
## 2025-08-04 NOTE — CARE PLAN
The patient is Stable - Low risk of patient condition declining or worsening    Shift Goals  Clinical Goals: Pulmonary hygiene  Patient Goals: Pain control, rest  Family Goals: Family not present    Progress made toward(s) clinical / shift goals:    Problem: Knowledge Deficit - Standard  Goal: Patient and family/care givers will demonstrate understanding of plan of care, disease process/condition, diagnostic tests and medications  Outcome: Progressing     Problem: Skin Integrity  Goal: Skin integrity is maintained or improved  Outcome: Progressing     Problem: Fall Risk  Goal: Patient will remain free from falls  Outcome: Progressing     Problem: Pain - Standard  Goal: Alleviation of pain or a reduction in pain to the patient’s comfort goal  Outcome: Progressing     Problem: Safety - Medical Restraint  Goal: Remains free of injury from restraints (Restraint for Interference with Medical Device)  Outcome: Met  Goal: Free from restraint(s) (Restraint for Interference with Medical Device)  Outcome: Met

## 2025-08-04 NOTE — PROGRESS NOTES
MONITOR SUMMARY  Rate:   Rhythm: SR/ST  Ectopy:  Measurements:    MT: 0.134; QRS: 0.088; QTC: 0.442

## 2025-08-05 ENCOUNTER — APPOINTMENT (OUTPATIENT)
Dept: RADIOLOGY | Facility: MEDICAL CENTER | Age: 65
DRG: 957 | End: 2025-08-05
Attending: EMERGENCY MEDICAL TECHNICIAN, INTERMEDIATE
Payer: COMMERCIAL

## 2025-08-05 ENCOUNTER — APPOINTMENT (OUTPATIENT)
Dept: RADIOLOGY | Facility: MEDICAL CENTER | Age: 65
DRG: 957 | End: 2025-08-05
Payer: COMMERCIAL

## 2025-08-05 PROBLEM — R46.89 AGGRESSIVE BEHAVIOR: Status: ACTIVE | Noted: 2025-08-05

## 2025-08-05 PROCEDURE — 71045 X-RAY EXAM CHEST 1 VIEW: CPT

## 2025-08-05 PROCEDURE — 73590 X-RAY EXAM OF LOWER LEG: CPT | Mod: RT

## 2025-08-05 PROCEDURE — 73600 X-RAY EXAM OF ANKLE: CPT | Mod: LT

## 2025-08-05 ASSESSMENT — PAIN DESCRIPTION - PAIN TYPE
TYPE: ACUTE PAIN

## 2025-08-05 NOTE — PROGRESS NOTES
4 Eyes Skin Assessment Completed by GILMAR Rawls and GILMAR Salazar.    Skin assessment is primarily focused on high risk bony prominences. Pay special attention to skin beneath and around medical devices, high risk bony prominences, skin to skin areas and areas where the patient lacks sensation to feel pain and areas where the patient previously had breakdown.     Head (Occipital):  WDL   Ears (Under Medical Devices): WDL   Nose (Under Medical Devices): WDL   Mouth:  Red   Neck: C-collar in place, mepilex observed front and back   Breast/Chest:  R CT site DIP, old CT site R and L both DIP   Shoulder Blades:  WDL   Spine:   WDL   (R) Arm/Elbow/Hand: Abrasion and Bruising   (L) Arm/Elbow/Hand: Abrasion and Bruising   Abdomen: MLI DIP   Pannus/Groin:  Condom cath in place   Sacrum/Coccyx:   Red and Blanching   (R) Ischial Tuberosity (Sit Bones):  Blanching   (L) Ischial Tuberosity (Sit Bones):  Blanching   (R) Leg:  Abrasion, Scab, Red, and Bruising R surgical sites DIP   (L) Leg:  Abrasion, Scab, Red, and Bruising   (R) Heel:  WDL   (R) Foot/Toe: Abrasion, Red, and Bruising see images   (L) Heel: Red, Purple/maroon, Non-Blanching, and Bruising see images   (L) Foot/Toe:  Abrasion, Red, and Bruising       DEVICES IN USE:   Respiratory Devices:  Nasal cannula  Feeding Devices:  N/A   Lines & BP Monitoring Devices:  Peripheral IV    Orthopedic Devices:  LLE cam boot  Miscellaneous Devices:  Chest tube(s) and Condom cath    PROTOCOL INTERVENTIONS:   Low Airloss Bed:  Applied this assessment  Offloading Dressing - Heel:  Applied this assessment  Float Heels with Pillows:  Applied this assessment  Condom Cath/Purewick:  Already in place  Silicone Nasal Cannula Tubing:  Already in place    WOUND PHOTOS:   Completed and in EPIC     WOUND CONSULT:   Wound team already following area(s) of concern

## 2025-08-05 NOTE — PROGRESS NOTES
"Pt verbally aggressive towards RN and staff stating he refuses all care including vitals, neuro checks, assessments, hourly rounding, and blood draw. Pt stated \"get the hell out of my room I don't want you in here\". Escalated situation to Charge RN/Supervisor.   "

## 2025-08-05 NOTE — PROGRESS NOTES
Pt admitted to room T410 [facility] Report received from GILMAR Shaw.      Patient reports pain at 1 on a scale of 0-10. Educated patient regarding pharmacologic and non pharmacologic modalities for pain management. Oriented to room call light and smoking policy.  Reviewed plan of care (equipment, incentive spirometer, sequential compression devices, medications, activity, diet, fall precautions, skin care, and pain) with patient and family. Welcome packet given and reviewed with patient, all questions answered. Education provided on oral hygiene program.     AA&Ox4. Denies CP/SOB.  See 2 RN skin note  Tolerating regular diet. Denies N/V.  + void via condom cath + BM. Last BM 8/5  Pt ambulates x1-2, RLE NWB, LLE WBAT with cam boot    All needs met at this time. Call light within reach. Pt calls appropriately. Bed low and locked, non skid socks in place. Hourly rounding in place.
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
## 2025-08-05 NOTE — PROGRESS NOTES
"    Orthopedic PA Progress Note    Interval changes:  Patient resting and cooperative during visit.   RLE dressings CDI   - staples removed to RLE; no evidence of dehiscence  Postop XR RLE show stable hardware  L fibula fracture stable- boot on for all mobilizing  NWB RLE until 6 weeks postop   WBAT LLE in boot   Ok for dc planning from ortho standpoint when medically appropriate   Ortho signing off    ROS - Pain appears controlled.    /60   Pulse 95   Temp 36.5 °C (97.7 °F) (Temporal)   Resp (!) 34   Ht 1.803 m (5' 10.98\")   Wt 70.9 kg (156 lb 4.9 oz)   SpO2 99%     Patient seen and examined  No acute distress, alert   Breathing non labored  RRR  RLE: Dressings CDI. Incisions without purulence of erythema. DF/PF intact. Flexes and extends toes. Sensation appears intact. DP pulse 2+. Pulses 2+    Recent Labs     08/03/25  0400 08/04/25  0228 08/05/25  1057   WBC 15.3* 15.5* 13.8*   RBC 2.73* 2.77* 2.93*   HEMOGLOBIN 8.5* 8.6* 9.0*   HEMATOCRIT 25.3* 26.4* 28.2*   MCV 92.7 95.3 96.2   MCH 31.1 31.0 30.7   MCHC 33.6 32.6 31.9*   RDW 49.1 51.1* 50.5*   PLATELETCT 492* 561* 677*   MPV 10.1 9.9 9.6       Active Hospital Problems    Diagnosis     Pneumothorax, right [J93.9]      Priority: High    Grade III injury of left kidney [S37.002A]      Priority: High    Closed fracture of transverse process of cervical vertebra (HCC) [S12.9XXA]      Priority: High    Multiple fractures of ribs, bilateral, initial encounter for closed fracture [S22.43XA]      Priority: High    Aggressive behavior [R46.89]      Priority: Medium    Leukocytosis [D72.829]      Priority: Medium    Acute blood loss anemia [D62]      Priority: Medium    Open fracture of right tibia and fibula [S82.201B, S82.401B]      Priority: Medium    Pneumoperitoneum [K66.8]      Priority: Medium    Closed fracture of distal end of left fibula [S82.832A]      Priority: Medium    Insomnia [G47.00]      Priority: Low    Trauma [T14.90XA]      Priority: Low "    Nasal bone fracture [S02.2XXA]      Priority: Low    No contraindication to deep vein thrombosis (DVT) prophylaxis [Z78.9]      Priority: Low       DX-ANKLE 2- VIEWS LEFT   Final Result      1.  Stable alignment of the oblique fracture of the distal diaphysis and metaphysis of the left fibula.   2.  Incompletely imaged healed fracture of the left fibular diaphysis.   3.  Lateral left ankle soft tissue swelling.      DX-TIBIA AND FIBULA RIGHT   Final Result      1.  Status post ORIF of the comminuted fracture of the right tibia, with improved alignment of the fracture fragments.   2.  No inappropriate radiopaque foreign object.      DX-CHEST-PORTABLE (1 VIEW)   Final Result         1.  Pulmonary edema and/or infiltrates are identified, stable since the prior exam.   2.  Trace right pleural effusion, stable   3.  Bilateral rib fractures                  DX-CHEST-PORTABLE (1 VIEW)   Final Result      1.  Improved aeration of the right upper lobe.   2.  No visible pneumothorax after removal of the more inferior right chest tube.   3.  The remainder is stable.      DX-CHEST-PORTABLE (1 VIEW)   Final Result         1.  Pulmonary edema and/or infiltrates are identified, stable since the prior exam.   2.  Trace right pleural effusion, stable   3.  Bilateral rib fractures               DX-CHEST-LIMITED (1 VIEW)   Final Result      1.  Supportive tubing as described above.   2.  Slight improvement of hazy opacity in the periphery of the RIGHT lung.   3.  No pneumothorax.      DX-CHEST-PORTABLE (1 VIEW)   Final Result         Unchanged bilateral rib fractures with right upper lobe lung contusion. No pneumothorax.   Unchanged lines and tubes.      DX-CHEST-PORTABLE (1 VIEW)   Final Result         1.  Pulmonary edema and/or infiltrates are identified, slightly increased since the prior exam.   2.  Trace right pleural effusions   3.  Bilateral rib fractures            DX-CHEST-PORTABLE (1 VIEW)   Final Result         1.   Pulmonary edema and/or infiltrates are identified, slightly increased since the prior exam.   2.  Trace right pneumothorax with thoracostomy tube in place, decreased since prior study.   3.  Trace bilateral pleural effusions   4.  Bilateral rib fractures         CT-CHEST (THORAX) W/O   Final Result      1.  Small/moderate right hydropneumothorax. 3 right side chest tubes are identified. One of these is within the fissure.   2.  Trace left pleural effusion. Trace left pneumothorax. Interval removal of the left-sided chest tube.   3.  Peripheral predominant densities throughout the right lung, likely contusions.   4.  Bilateral lower lobe consolidations.   5.  Trace pneumomediastinum.   6.  Emphysematous changes.   7.  Right greater than left chest and neck subcutaneous gas.   8.  Bilateral rib fractures again seen.   9.  1.5 cm left renal parenchymal calcification versus retained contrast.         US-TRAUMA VEIN SCREEN LOWER BILAT EXTREMITY   Final Result      DX-CHEST-PORTABLE (1 VIEW)   Final Result      1.  Stable right apical pneumothorax.   2.  Stable right upper lung airspace disease.   3.  Stable right chest tubes.      DX-CHEST-PORTABLE (1 VIEW)   Final Result      1.  Interval increase in right apical pneumothorax with 2 chest tubes in place.   2.  Questionable tiny left apical pneumothorax.   3.  Probable small right pleural effusion.      DX-CHEST-PORTABLE (1 VIEW)   Final Result         1.  Pulmonary edema and/or infiltrates are identified, which are stable since the prior exam.   2.  Trace right pneumothorax with thoracostomy tube in place, slightly increased since prior study.      DX-CHEST-PORTABLE (1 VIEW)   Final Result      1.  Mild interval increase in size of a small right pneumothorax.      2.  Stable small left apical pneumothorax.      3.  Bilateral pulmonary infiltrates.      DX-TIBIA AND FIBULA LEFT   Final Result      Acute nondisplaced distal fibular diametaphysis fracture.       [id number]-6 VIEW   Final Result      Multiple dilated bowel loops could represent postoperative ileus although cannot exclude partial obstruction.      DX-CHEST-PORTABLE (1 VIEW)   Final Result         1.  Pulmonary edema and/or infiltrates are identified, which are stable since the prior exam.   2.  Trace right pneumothorax with thoracostomy tube in place.      DX-CHEST-PORTABLE (1 VIEW)   Final Result      1.  Interval improvement in paratracheal opacity.   2.  Small right-sided pneumothorax.      DX-CHEST-PORTABLE (1 VIEW)   Final Result      No definite residual pneumothorax. Stable chest and neck soft tissue emphysema.      DX-CHEST-PORTABLE (1 VIEW)   Final Result      Interval placement of right-sided chest tube with mildly increased right pneumothorax.      CT-CHEST (THORAX) W/O   Final Result      1.  Moderate right hydropneumothorax. Trace left pneumothorax. Bilateral chest tubes in place.   2.  Pneumomediastinum.   3.  Partially collapsed right upper lobe, right middle lobe and right lower lobe.   4.  Extensive subcutaneous edema throughout the lower neck and bilateral chest wall.   5.  Bilateral rib fractures are redemonstrated.         DX-CHEST-PORTABLE (1 VIEW)   Final Result      1.  Small bilateral pneumothoraces.   2.  Stable bilateral chest and neck soft tissue emphysema.      DX-CHEST-PORTABLE (1 VIEW)   Final Result      No acute process.      DX-CHEST-PORTABLE (1 VIEW)   Final Result   Addendum (preliminary) 1 of 1   Addendum: NG tube extends into the fundus of the stomach.      Final      1.  Interval placement of a second right-sided chest tube with interval resolution of right-sided pneumothorax.   2.  Minimal residual left apical pneumothorax.   3.  Extensive subcutaneous emphysema throughout the chest and base of the neck.   4.  Pneumomediastinum.   5.  Numerous bilateral rib fractures in the upper hemithoraces.      DX-CHEST-PORTABLE (1 VIEW)   Final Result      1.  Multiple bilateral  rib fractures.   2.  Extensive soft tissue emphysema.   3.  Moderate right-sided pneumothorax with slight increase in size since prior exam.      DX-CHEST-LIMITED (1 VIEW)   Final Result         1.  Interstitial opacities suggests subtle edema and/or infiltrates, stable since prior studies.   2.  Right pneumothorax, increased in size since prior study.   3.  Extensive soft tissue gas in the bilateral chest wall and neck   4.  Bilateral rib fractures      DX-CHEST-LIMITED (1 VIEW)   Final Result         1.  Interstitial opacities suggests subtle edema and/or infiltrates, stable since prior studies.   2.  Right pneumothorax, decreased in size since prior study.   3.  Extensive soft tissue gas in the bilateral chest wall and neck   4.  Bilateral rib fractures      DX-CHEST-PORTABLE (1 VIEW)   Final Result         1.  Interstitial opacities suggests subtle edema and/or infiltrates, stable since prior studies.   2.  Large recurrent right pneumothorax, significantly increased since prior study. Subsequent film demonstrates reexpansion of the right lung.   3.  Extensive soft tissue gas in the bilateral chest wall and neck   4.  Bilateral rib fractures      CT-MAXILLOFACIAL W/O PLUS RECONS   Final Result         1.  Right anterior maxillary sinus wall fracture, age indeterminant.   2.  Extensive subcutaneous edema throughout the visualized neck extending onto the bilateral face and in the left periorbital soft tissues.   3.  Bilateral maxillary sinusitis changes.      CT-CTA NECK WITH & W/O-POST PROCESSING   Final Result         1.  No visualized aneurysm, dissection, occlusion, or high-grade stenosis identified.   2.  Large right and small left pneumothorax, thoracostomy tubes are in place.   3.  Cervical spine fractures, see dedicated CT cervical spine yesterday for further characterization.      These findings were discussed with the patient's clinician, Leslie Pierre, on 7/25/2025 12:41 AM.         DX-CHEST-PORTABLE  (1 VIEW)   Final Result         1.  Interstitial opacities suggests subtle edema and/or infiltrates.   2.  Extensive soft tissue gas in the bilateral chest wall and neck   3.  Bilateral rib fractures      US-TRAUMA VEIN SCREEN LOWER BILAT EXTREMITY   Final Result      EC-ECHOCARDIOGRAM LTD W/O CONT   Final Result      DX-CHEST-PORTABLE (1 VIEW)   Final Result      1.  Extensive subcutaneous emphysema and pneumomediastinum.   2.  Small residual bilateral pneumothoraces.   3.  Multiple bilateral rib fractures in the upper hemithoraces.      DX-PORTABLE FLUORO > 1 HOUR   Final Result      Portable fluoroscopy utilized for 2 minutes 30 seconds.         INTERPRETING LOCATION: [address], [place], [address]      DX-TIBIA AND FIBULA RIGHT   Final Result      Digitized intraoperative radiograph is submitted for review. This examination is not for diagnostic purpose but for guidance during a surgical procedure. Please see the patient's chart for full procedural details.         INTERPRETING LOCATION: [address], [place], [address]      DX-CHEST-PORTABLE (1 VIEW)   Final Result      1.  Bilateral chest tubes present. Previously seen pneumothoraces have resolved.      2.  Multiple bilateral rib fractures.      US-ABDOMEN F.A.S.T. LTD (FOR ED USE ONLY)   Final Result      No free fluid seen in all 4 quadrants.      Pneumoperitoneum.            CT-LSPINE W/O PLUS RECONS   Final Result      1.  Mild dextroscoliosis of the lumbar spine.   2.  Moderate discal degenerative changes from the L3-4 level through the L5-S1 level with mild marginal osteophytosis.   3.  No evidence of acute lumbar spine fracture and/or subluxation.   4.  Intraparenchymal hematoma involving the lateral convexity of the left kidney. Grade 3 left renal injury.   5.  Wedge-shaped areas of decreased attenuation involving the left upper pole and left lower pole posteriorly consistent with renal infarcts/contusions.      CT-TSPINE W/O PLUS RECONS   Final Result       1.  Left C7 nondisplaced transverse process fracture.   2.  Multiple posterior rib fractures from T1 through T4 bilaterally and on the left extending to T6.   3.  Small right pneumothorax and moderate left pneumothorax.   4.  Extensive pneumomediastinum and subcutaneous emphysema.   5.  No evidence for acute fracture or subluxation of the thoracic spine      CT-CTA LOWER EXT WITH & W/O-POST PROCESS RIGHT   Final Result      1.  Comminuted fracture of the metadiaphyseal diaphyseal region of the right proximal tibia.   2.  Oblique fracture of the right distal tibia.   3.  Comminuted fracture of the midshaft of the right fibula in near-anatomic alignment.   4.  No evidence of acute arterial injury in the right lower extremity.         CT-CHEST,ABDOMEN,PELVIS WITH   Final Result      1.  Extensive pneumomediastinum and subcutaneous emphysema throughout the chest and neck.   2.  Small residual right anterior pneumothorax following chest tube placement.   3.  Moderate left pneumothorax.   4.  Multiple bilateral rib fractures as described above.   5.  Extensive pneumoperitoneum suspicious for perforation of a hollow viscus.   6.  Wedge-shaped intraparenchymal hemorrhage involving the lateral aspect of the left kidney consistent with a grade 3 renal injury.   7.  Multiple left renal infarcts/contusions. No evidence of free fluid about the left kidney.      8.  These findings were discussed with LISA PRABHAKAR at 9:40 AM 7/23/2025      CT-CSPINE WITHOUT PLUS RECONS   Final Result      1.  No evidence of cervical spine fracture and/or subluxation.   2.  Moderate osteoarthritic changes at C5-6 and C6-7 levels.   3.  Extensive subcutaneous emphysema and pneumomediastinum.   4.  Multiple upper rib fractures posteriorly and laterally.   5.  Left C7 transverse process fracture nondisplaced.   6.  Left first rib fracture near its costovertebral junction and also anteriorly.   7.  Bilateral second through fourth rib fractures  posteriorly.   8.  Small right pneumothorax and moderate left pneumothorax.      CT-HEAD W/O   Final Result      1.  Nondisplaced left-sided nasal bone fracture.   2.  Extensive subcutaneous emphysema about the skull base and involving the temporalis fossa and  spaces bilaterally as well as the parapharyngeal spaces.   3.  Left-sided pneumoorbita.   4.  No evidence of intracranial hemorrhage or subdural hematoma formation.                  DX-PELVIS-1 OR 2 VIEWS   Final Result      Normal AP view of the pelvis.      DX-CHEST-LIMITED (1 VIEW)   Final Result      1.  Interval increase in size of small to moderate left-sided pneumothorax.   2.  Interval resolution of right-sided pneumothorax following chest tube placement.   3.  Extensive subcutaneous emphysema throughout the base of the neck and chest.   4.  Pneumomediastinum.   5.  Multiple bilateral rib fractures in the upper and mid hemithoraces.      DX-CHEST-LIMITED (1 VIEW)   Final Result      1.  Moderate right-sided pneumothorax and small left-sided pneumothorax.   2.  Extensive subcutaneous emphysema about the chest and base of the neck.   3.  Pneumomediastinum.   4.  Multiple bilateral rib fractures in the upper and mid hemithoraces bilaterally.      DX-TIBIA AND FIBULA RIGHT   Final Result      1.  Markedly comminuted fracture of the metadiaphyseal region of the right proximal tibia was slight volar angulation.      2.  Oblique fracture of distal diaphyseal region of right tibia with slight volar angulation.      3.  Slightly comminuted mid shaft right fibular fracture with slight volar angulation          Assessment/Plan:  Patient resting and cooperative during visit.   RLE dressings CDI   - staples removed to RLE; no evidence of dehiscence  Postop XR RLE show stable hardware  L fibula fracture stable- boot on for all mobilizing  NWB RLE until 6 weeks postop   WBAT LLE in boot   Ok for dc planning from ortho standpoint when medically appropriate    Ortho signing off    POD#13 S/p  Intramedullary nailing of right segmental tibia shaft fracture  Percutaneous screw fixation tibial plateau  Nonoperative management left distal fibula fx  Wt bearing status - NWB RLE. WBAT LLE  Future Procedures - None planned   Sutures/Staples- removed 8/5 to RLE  Inpatient DVT prophylaxis: Lovenox initiated 7/24  DVT Prophylaxis outpatient: ASA 81 mg PO BID x4 weeks  PT/OT-initiated  Antibiotics:  Perioperative completed  DVT Prophylaxis- TEDS/SCDs/Foot pumps  Case Coordination for Discharge Planning - Disposition per therapy recs.   Follow up with Dr. Ragsdale 6 weeks following final surgery for repeat imaging.

## 2025-08-05 NOTE — CARE PLAN
The patient is Stable - Low risk of patient condition declining or worsening    Shift Goals  Clinical Goals: Pulm Hygiene, Monitor ChBetty Tu. Output  Patient Goals: Rest  Family Goals: DYLAN    Progress made toward(s) clinical / shift goals:      Problem: Knowledge Deficit - Standard  Goal: Patient and family/care givers will demonstrate understanding of plan of care, disease process/condition, diagnostic tests and medications  Outcome: Progressing     Problem: Skin Integrity  Goal: Skin integrity is maintained or improved  Outcome: Progressing     Problem: Fall Risk  Goal: Patient will remain free from falls  Outcome: Progressing     Problem: Pain - Standard  Goal: Alleviation of pain or a reduction in pain to the patient’s comfort goal  Outcome: Progressing       Patient is not progressing towards the following goals:

## 2025-08-05 NOTE — PROGRESS NOTES
"Bedside report received from Elyssa. Pt A&Ox4. POC discussed with pt. Pt verbally aggressive towards staff and stating he would like to refuse all care including VS, 0000 and 0400 neurological assessments, and hourly rounding for the evening. Pt re-educated on the need to adhere to hospital policies and DR orders for his safety. Pt requested staff to exit his room and \"leave him alone for the remainder of the shift\". This RN and Elyssa RN attempted to de-escalate without success. Call light and belongings within reach. Bed locked and in lowest position. Alarm and fall precautions in place. All needs met at this time.     Pt requests escalated to charge RN. Charge RN/Supervisor spoke with patient, pt continued being verbally aggressive using profanities towards charge RN.      "

## 2025-08-05 NOTE — PROGRESS NOTES
Bedside report received from [facility] and patient care assumed. Agree with previous nurse assessment. All fall precautions are in place, belongings at bedside table. Pt educated on use of call light for assistance. Bed locked and in lowest position.

## 2025-08-06 ENCOUNTER — APPOINTMENT (OUTPATIENT)
Dept: RADIOLOGY | Facility: MEDICAL CENTER | Age: 65
DRG: 957 | End: 2025-08-06
Payer: COMMERCIAL

## 2025-08-06 PROCEDURE — 71045 X-RAY EXAM CHEST 1 VIEW: CPT

## 2025-08-06 PROCEDURE — 72040 X-RAY EXAM NECK SPINE 2-3 VW: CPT

## 2025-08-06 ASSESSMENT — FIBROSIS 4 INDEX: FIB4 SCORE: 1.72

## 2025-08-06 ASSESSMENT — COGNITIVE AND FUNCTIONAL STATUS - GENERAL
CLIMB 3 TO 5 STEPS WITH RAILING: TOTAL
MOVING FROM LYING ON BACK TO SITTING ON SIDE OF FLAT BED: A LOT
SUGGESTED CMS G CODE MODIFIER MOBILITY: CL
STANDING UP FROM CHAIR USING ARMS: A LOT
MOVING TO AND FROM BED TO CHAIR: A LOT
TURNING FROM BACK TO SIDE WHILE IN FLAT BAD: A LOT
WALKING IN HOSPITAL ROOM: TOTAL
MOBILITY SCORE: 10

## 2025-08-06 ASSESSMENT — PAIN DESCRIPTION - PAIN TYPE
TYPE: ACUTE PAIN

## 2025-08-06 ASSESSMENT — ENCOUNTER SYMPTOMS
FEVER: 0
NERVOUS/ANXIOUS: 0
ABDOMINAL PAIN: 0
NAUSEA: 0
MYALGIAS: 1
SENSORY CHANGE: 0
CHILLS: 0
VOMITING: 0

## 2025-08-06 ASSESSMENT — GAIT ASSESSMENTS: GAIT LEVEL OF ASSIST: UNABLE TO PARTICIPATE

## 2025-08-06 NOTE — DISCHARGE PLANNING
PM&R referral from Camila Soto. Per chart review un homed. no accepting post acute facilities. [place] [employer] [facility] to consult for plan of care. [facility] will not be following.

## 2025-08-06 NOTE — THERAPY
Physical Therapy   Daily Treatment     Patient Name:  Jairo Abreu  Age:  65 y.o., Sex:  person  Medical Record #:  [id number]  Today's Date: 8/6/2025     Precautions  Medical: Fall Risk, Orthostatic Hypotension  Orthopedic: Cervical Collar, CAM Boot  Surgical: Chest Tube, Spinal- Cervical (Rt chest tube x 1)  Weight Bearing: Non Weight Bearing Right Lower Extremity, Weight Bearing as Tolerated Left Lower Extremity (CAM boot for Lft LE)  Comments: Collar on AAT's    Assessment    The pt eating lunch, willing to participate. The pt nods off throughout tx session needing cueing throughout to keep his eyes open, difficult to keep on task. Today the pt required CGA for sup<->sit transitions w/HOB flat and no railing. Once seated he would close eyes, cueing throughout to stay on task. The pt's tx session limited by his BP response, orthostatic w/increase in HR.   Anticipate the pt is going to progress functionally once BP improves and he is able to stay awake. Per nrsg he did not get any dilaudid only oxy today.   Nrsg notified of BP and Rt LE edema/redness.  PT will cont to progress.     Plan    Treatment Plan Status: Continue Current Treatment Plan  Type of Treatment: Bed Mobility, Equipment, Gait Training, Neuro Re-Education / Balance, Self Care / Home Evaluation, Stair Training, Therapeutic Activities, Therapeutic Exercise, Family / Caregiver Training  Treatment Frequency: 4 Times per Week  Treatment Duration: Until Therapy Goals Met    DC Equipment Recommendations: Unable to determine at this time  Discharge Recommendations: Recommend post-acute placement for additional physical therapy services prior to discharge home     Objective       08/06/25 1225   Time In/Time Out   Therapy Start Time 1147   Therapy End Time 1225   Total Therapy Time 38   Charge Group   PT Therapeutic Activities (Units) 3   Total Time Spent   PT Therapeutic Activities Time Spent (Mins) 38   PT Total Time Spent (Calculated) 38   Precautions    Medical Fall Risk;Orthostatic Hypotension   Orthopedic Cervical Collar;CAM Boot   Surgical Chest Tube;Spinal- Cervical  (Rt chest tube x 1)   Weight Bearing Non Weight Bearing Right Lower Extremity;Weight Bearing as Tolerated Left Lower Extremity  (CAM boot for Lft LE)   Comments Collar on AAT's   Vitals   Vitals Comments BP during PT session: Supine 123/61 HR 88, /65 , Standing 103/69 . Symptomatic.   Pain 0 - 10 Group   Location Generalized   Pain Rating Scale (NPRS) 1   Therapist Pain Assessment During Activity;Nurse Notified   Other Treatments   Other Treatments Provided Total assist to don/doff CAM boot, educated on the importance of maintaining his NWB precautions. EOB for 10 mins for BP's.   Balance   Sitting Balance (Static) Fair   Sitting Balance (Dynamic) Fair -   Standing Balance (Static) Poor   Standing Balance (Dynamic) Poor -   Weight Shift Sitting Fair   Weight Shift Standing Poor   Skilled Intervention Verbal Cuing   Comments standing w/FWW   Bed Mobility    Supine to Sit Contact Guard Assist   Sit to Supine Contact Guard Assist   Scooting Contact Guard Assist  (seated)   Rolling Contact Guard Assist   Skilled Intervention Verbal Cuing;Compensatory Strategies   Comments HOB partially elevated and use of railing   Gait Analysis   Gait Level Of Assist Unable to Participate   Comments The pt's BP response w/standing limiting ability to initiate ambulation.   Functional Mobility   Sit to Stand Minimal Assist  (from EOB->FWW)   Bed, Chair, Wheelchair Transfer Unable to Participate   Skilled Intervention Postural Facilitation   Comments Unable to transfer to chair today d/t BP.   6 Clicks Assessment - How much HELP from from another person do you currently need... (If the patient hasn't done an activity recently, how much help from another person do you think he/she would need if he/she tried?)   Turning from your back to your side while in a flat bed without using bedrails? 2   Moving  from lying on your back to sitting on the side of a flat bed without using bedrails? 2   Moving to and from a bed to a chair (including a wheelchair)? 2   Standing up from a chair using your arms (e.g., wheelchair, or bedside chair)? 2   Walking in hospital room? 1   Climbing 3-5 steps with a railing? 1   6 clicks Mobility Score 10   Short Term Goals    Short Term Goal # 1 Pt will perform supine <> sit with min A in 6 visits to progress bed mobility   Goal Outcome # 1 Goal met   Short Term Goal # 2 Pt will perform STS with FWW and min A in 6 visits to progress OOB mobility   Goal Outcome # 2 Goal met   Short Term Goal # 3 Pt will perform squat/stand pivot transfers with FWW and min A in 6 visits to progress functional OOB mobility   Goal Outcome # 3 Goal not met   Education Group   Weight Bearing Precautions Patient Response Patient;Acceptance;Explanation;Action Demonstration   Physical Therapy Treatment Plan   Physical Therapy Treatment Plan Continue Current Treatment Plan   Anticipated Discharge Equipment and Recommendations   DC Equipment Recommendations Unable to determine at this time   Discharge Recommendations Recommend post-acute placement for additional physical therapy services prior to discharge home   Interdisciplinary Plan of Care Collaboration   IDT Collaboration with  Nursing   Patient Position at End of Therapy In Bed;Bed Alarm On;Call Light within Reach;Tray Table within Reach   Collaboration Comments Nrsg notified of swollen Rt calf and BP during session   Session Information   Date / Session Number  8/6--2 (2/4, 8/6) PTA/1. Needs updated goals plus gait goal   Supervising Physical Therapist (PTA Treatments Only)   Supervising Physical Therapist Raysa Beasley

## 2025-08-06 NOTE — PROGRESS NOTES
Bedside report received.  Assessment complete.  A&O x 4. Patient calls appropriately.  Patient ambulates with x1-2 assist. Bed alarm on.   Patient has 2/10 pain. Patient medicated per MAR.  Denies N&V. Tolerating regular diet.  Surgical R CT to WS, x2 old CT sites DIP, MLI with island dressing, C-collar inplace.  + void via condom cath, + flatus, - BM.  Patient denies SOB.  SCD's refused.  Review plan with of care with patient. Call light and personal belongings within reach. Hourly rounding in place. All needs met at this time.

## 2025-08-06 NOTE — PROGRESS NOTES
Bedside report received.  Assessment complete.  A&O x 4. Patient calls appropriately.  Patient ambulates with x2 assist. Bed alarm on.   Patient has 5/10 pain. Pain managed with prescribed medications.  Denies N&V. Tolerating regular diet.  Right chest tube to water seal.  + void via condom cath, + flatus, + BM.  Patient denies SOB.  Review plan with of care with patient. Call light and personal belongings within reach. Hourly rounding in place. All needs met at this time.
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
## 2025-08-06 NOTE — THERAPY
Occupational Therapy Contact Note    Patient Name: Jairo Abreu  Age:  65 y.o., Sex:  person  Medical Record #: [id number]  Today's Date: 8/6/2025    Discussed missed therapy with Nsg & PTA       08/06/25 3768   Interdisciplinary Plan of Care Collaboration   Collaboration Comments OT tx attempted.  Pt was orthostatic during PT tx & Nsg giving pt a bolus.  Will attempt another time.

## 2025-08-06 NOTE — CARE PLAN
The patient is Stable - Low risk of patient condition declining or worsening    Shift Goals  Clinical Goals: pain control, monitor chest tube maintain ROM precautions, wean O2, mobility  Patient Goals: rest  Family Goals: DYLAN    Progress made toward(s) clinical / shift goals:      Patient's skin integrity will remain intact with frequent assessment and encouraging mobility as tolerated. Patient will remain free from falls with use of bed alarm, appropriate use of call light, and collaborating with PT.    Problem: Skin Integrity  Goal: Skin integrity is maintained or improved  Outcome: Progressing     Problem: Fall Risk  Goal: Patient will remain free from falls  Outcome: Progressing

## 2025-08-06 NOTE — CARE PLAN
The patient is Stable - Low risk of patient condition declining or worsening    Shift Goals  Clinical Goals: pain management, pulmonary hygiene, safety, chest tube management, rest  Patient Goals: pain control, rest  Family Goals: DYLAN    Progress made toward(s) clinical / shift goals:    Problem: Knowledge Deficit - Standard  Goal: Patient and family/care givers will demonstrate understanding of plan of care, disease process/condition, diagnostic tests and medications  Outcome: Progressing     Problem: Skin Integrity  Goal: Skin integrity is maintained or improved  Outcome: Progressing     Problem: Fall Risk  Goal: Patient will remain free from falls  Outcome: Progressing     Problem: Pain - Standard  Goal: Alleviation of pain or a reduction in pain to the patient’s comfort goal  Outcome: Progressing       Patient is not progressing towards the following goals:
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
## 2025-08-07 ENCOUNTER — APPOINTMENT (OUTPATIENT)
Dept: RADIOLOGY | Facility: MEDICAL CENTER | Age: 65
DRG: 957 | End: 2025-08-07
Payer: COMMERCIAL

## 2025-08-07 PROBLEM — I82.811 VENOUS EMBOLISM AND THROMBOSIS OF SUPERFICIAL VESSELS OF RIGHT LOWER EXTREMITY: Status: ACTIVE | Noted: 2025-08-07

## 2025-08-07 PROCEDURE — 71045 X-RAY EXAM CHEST 1 VIEW: CPT

## 2025-08-07 PROCEDURE — 93970 EXTREMITY STUDY: CPT

## 2025-08-07 ASSESSMENT — ENCOUNTER SYMPTOMS
FEVER: 0
VOMITING: 0
ABDOMINAL PAIN: 0
CHILLS: 0
SENSORY CHANGE: 0
MYALGIAS: 1
NERVOUS/ANXIOUS: 0
NAUSEA: 0

## 2025-08-07 ASSESSMENT — COGNITIVE AND FUNCTIONAL STATUS - GENERAL
HELP NEEDED FOR BATHING: A LOT
CLIMB 3 TO 5 STEPS WITH RAILING: TOTAL
SUGGESTED CMS G CODE MODIFIER MOBILITY: CL
PERSONAL GROOMING: A LITTLE
MOVING TO AND FROM BED TO CHAIR: A LOT
TOILETING: A LITTLE
TOILETING: A LITTLE
MOVING FROM LYING ON BACK TO SITTING ON SIDE OF FLAT BED: A LOT
DRESSING REGULAR LOWER BODY CLOTHING: A LOT
SUGGESTED CMS G CODE MODIFIER DAILY ACTIVITY: CK
DRESSING REGULAR UPPER BODY CLOTHING: A LITTLE
DAILY ACTIVITIY SCORE: 18
TURNING FROM BACK TO SIDE WHILE IN FLAT BAD: A LOT
DRESSING REGULAR UPPER BODY CLOTHING: A LITTLE
MOBILITY SCORE: 10
HELP NEEDED FOR BATHING: A LOT
SUGGESTED CMS G CODE MODIFIER DAILY ACTIVITY: CK
DAILY ACTIVITIY SCORE: 17
WALKING IN HOSPITAL ROOM: TOTAL
STANDING UP FROM CHAIR USING ARMS: A LOT
DRESSING REGULAR LOWER BODY CLOTHING: A LOT

## 2025-08-07 ASSESSMENT — PAIN DESCRIPTION - PAIN TYPE
TYPE: ACUTE PAIN

## 2025-08-07 ASSESSMENT — PATIENT HEALTH QUESTIONNAIRE - PHQ9
2. FEELING DOWN, DEPRESSED, IRRITABLE, OR HOPELESS: NOT AT ALL
SUM OF ALL RESPONSES TO PHQ9 QUESTIONS 1 AND 2: 0
1. LITTLE INTEREST OR PLEASURE IN DOING THINGS: NOT AT ALL
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
## 2025-08-07 NOTE — PROGRESS NOTES
4 Eyes Skin Assessment Completed by GILMAR Edward and GILMAR Bernal.    Skin assessment is primarily focused on high risk bony prominences. Pay special attention to skin beneath and around medical devices, high risk bony prominences, skin to skin areas and areas where the patient lacks sensation to feel pain and areas where the patient previously had breakdown.     Head (Occipital):  WDL   Ears (Under Medical Devices): WDL   Nose (Under Medical Devices): WDL   Mouth:  WDL   Neck: C-collar in place, mepilex in place   Breast/Chest:  X2 old right CT sites, DIP. X1 old left CT site, DIP.   Shoulder Blades:  WDL   Spine:   WDL   (R) Arm/Elbow/Hand: Abrasion and Bruising   (L) Arm/Elbow/Hand: Abrasion and Bruising   Abdomen: MLI, AUSTIN.   Pannus/Groin:  WDL   Sacrum/Coccyx:   Red, slow to isaias, mepilex applied.    (R) Ischial Tuberosity (Sit Bones):  WDL   (L) Ischial Tuberosity (Sit Bones):  WDL   (R) Leg:  Abrasions, bruising, scab, swelling, incisions   (L) Leg:  Abrasions, bruising, scab, swelling, incisions   (R) Heel:  WDL   (R) Foot/Toe: Abrasion, Bruising, and Edema   (L) Heel: red   (L) Foot/Toe:  Abrasion, Bruising, and Edema       DEVICES IN USE:   Respiratory Devices:  Pulse ox  Feeding Devices:  N/A   Lines & BP Monitoring Devices:  Peripheral IV, BP cuff, and Pulse ox    Orthopedic Devices:  LLE CAM boot  Miscellaneous Devices:  N/A    PROTOCOL INTERVENTIONS:   Standard/Trauma Bed:  Already in place  Offloading Dressing - Sacrum:  Applied this assessment  Offloading Dressing - Heel:  Already in place    WOUND PHOTOS:   N/A no wounds identified    WOUND CONSULT:   N/A, no advanced wound care needs identified

## 2025-08-07 NOTE — CARE PLAN
The patient is Stable - Low risk of patient condition declining or worsening    Shift Goals  Clinical Goals: pain control, pulmonary hygiene, safety, rest  Patient Goals: pain management, rest  Family Goals: DYLAN    Progress made toward(s) clinical / shift goals:    Problem: Knowledge Deficit - Standard  Goal: Patient and family/care givers will demonstrate understanding of plan of care, disease process/condition, diagnostic tests and medications  Outcome: Progressing     Problem: Skin Integrity  Goal: Skin integrity is maintained or improved  Outcome: Progressing     Problem: Fall Risk  Goal: Patient will remain free from falls  Outcome: Progressing     Problem: Pain - Standard  Goal: Alleviation of pain or a reduction in pain to the patient’s comfort goal  Outcome: Progressing       Patient is not progressing towards the following goals:
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
## 2025-08-07 NOTE — ASSESSMENT & PLAN NOTE
Occlusive SVT in the right greater saphenous vein. No DVT appreciated.  Warm compresses PRN for discomfort.

## 2025-08-07 NOTE — THERAPY
Occupational Therapy  Daily Treatment     Patient Name:  Jairo Abreu  Age:  65 y.o., Sex:  person  Medical Record #:  [id number]  Today's Date:  8/7/2025    Precautions  Medical: Fall Risk  Orthopedic: Cervical Collar, CAM Boot (collar AAT)  Surgical: Spinal- Cervical  Weight Bearing: Non Weight Bearing Right Lower Extremity, Weight Bearing as Tolerated Left Lower Extremity (CAM boot for Lft LE)    Assessment  Pt agreeable to OT tx session and progressing as expected. Pt BP stable throughout (see vitals below). He was able to demo WC txf min A, donned boot max A (able to lift LLE to assist with donning), and g/h SPV. Pt able to accurately state WB precautions when cued. Overall he is limited by impaired cognition (appears depressed and apathetic), impaired balance, weakness, poor activity tolerance. Wheeled in the hallway to address cognitive concerns, pt more conversational once out of the room, however pt demo'ing negative self talk and minimal motivation. Will continue to follow for skilled OT services.    Plan    Treatment Plan Status: (P) Continue Current Treatment Plan  Type of Treatment: Self Care / Activities of Daily Living, Neuro Re-Education / Balance, Therapeutic Activity  Treatment Frequency: 4 Times per Week  Treatment Duration: Until Therapy Goals Met    DC Equipment Recommendations: (P) Unable to determine at this time  Discharge Recommendations: (P) Recommend post-acute placement for additional occupational therapy services prior to discharge home       08/07/25 1359   Precautions   Medical Fall Risk   Orthopedic Cervical Collar;CAM Boot  (collar AAT)   Surgical Spinal- Cervical   Weight Bearing Non Weight Bearing Right Lower Extremity;Weight Bearing as Tolerated Left Lower Extremity  (CAM boot for Lft LE)   Vitals   Vitals Comments BP supine 128/73, seated 118/71, standing 114/71, no reported dizziness   Cognition    Comments Initally not communicating, once up to WC more conversational. Overall  appearing apathetic and depressed. Able to state WB precautions   Balance   Sitting Balance (Static) Fair +   Sitting Balance (Dynamic) Fair   Standing Balance (Static) Fair -   Standing Balance (Dynamic) Poor   Weight Shift Sitting Fair   Weight Shift Standing Poor   Skilled Intervention Verbal Cuing   Comments with FWW   Bed Mobility    Supine to Sit Standby Assist   Scooting Standby Assist   Activities of Daily Living   Grooming Standby Assist   Upper Body Dressing Minimal Assist   Lower Body Dressing Maximal Assist   Toileting   (recent BM, declined toileting)   Skilled Intervention Verbal Cuing   How much help from another person does the patient currently need...   6 Clicks Daily Activity Score 17   Functional Mobility   Sit to Stand Contact Guard Assist   Bed, Chair, Wheelchair Transfer Minimal Assist   Toilet Transfers   (discussed toilet txf, pt declining txf at this time, recently had BM with nursing)   Transfer Method Stand Pivot   Mobility STSx2 > WC txf   Skilled Intervention Verbal Cuing;Tactile Cuing;Postural Facilitation   Comments slightly impulsive, BP stable, able to follow WB prxns   Patient / Family Goals   Patient / Family Goal #1 none stated   Short Term Goals   Short Term Goal # 1 supervised with UB dressing   Goal Outcome # 1 Progressing as expected   Short Term Goal # 2 supervised with LB dressing   Goal Outcome # 2 Progressing as expected   Short Term Goal # 3 supervised with ADL txfs   Goal Outcome # 3 Progressing as expected   Education Group   Role of Occupational Therapist Patient Response Patient;Acceptance;Explanation;Reinforcement Needed   Brace Wear & Care Patient Response Patient;Explanation;Nonacceptance;Reinforcement Needed   Occupational Therapy Treatment Plan    O.T. Treatment Plan Continue Current Treatment Plan   Anticipated Discharge Equipment and Recommendations   DC Equipment Recommendations Unable to determine at this time   Discharge Recommendations Recommend post-acute  placement for additional occupational therapy services prior to discharge home   Interdisciplinary Plan of Care Collaboration   IDT Collaboration with  Nursing   Patient Position at End of Therapy Seated;Chair Alarm On;Call Light within Reach  (in WC)   Collaboration Comments RN and CNA aware and updated on txf   Session Information   Date / Session Number  8/7 2 (1/4, 8/12)

## 2025-08-07 NOTE — PROGRESS NOTES
Bedside report received.  Assessment complete.  A&O x 4. Patient calls appropriately.  Patient ambulates with x2 assist. Bed alarm on.   Patient has 8/10 pain. Pain managed with prescribed medications.  Denies N&V. Tolerating regular diet.  + void, + flatus, - BM.  Patient denies SOB.  Patient is pleasant and cooperative with care plan.  Review plan with of care with patient. Call light and personal belongings within reach. Hourly rounding in place. All needs met at this time.

## 2025-08-07 NOTE — CARE PLAN
The patient is Stable - Low risk of patient condition declining or worsening    Shift Goals  Clinical Goals: pain management, patient safety and comfort, PT/OT  Patient Goals: rest  Family Goals: DYLAN    Progress made toward(s) clinical / shift goals:  Patient cooperated with PT and OT. Patient has consistently denied acute pain issues today. C-collar was discontinued per nursing communication order. Fall precautions remain in place.     Patient is not progressing towards the following goals: Pending DC clearance and placement.    Problem: Knowledge Deficit - Standard  Goal: Patient and family/care givers will demonstrate understanding of plan of care, disease process/condition, diagnostic tests and medications  Outcome: Progressing     Problem: Skin Integrity  Goal: Skin integrity is maintained or improved  Outcome: Progressing     Problem: Fall Risk  Goal: Patient will remain free from falls  Outcome: Progressing     Problem: Pain - Standard  Goal: Alleviation of pain or a reduction in pain to the patient’s comfort goal  Outcome: Progressing
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
## 2025-08-08 ENCOUNTER — APPOINTMENT (OUTPATIENT)
Dept: RADIOLOGY | Facility: MEDICAL CENTER | Age: 65
DRG: 957 | End: 2025-08-08
Payer: COMMERCIAL

## 2025-08-08 PROBLEM — R45.851 SUICIDAL IDEATION: Status: ACTIVE | Noted: 2025-08-08

## 2025-08-08 PROCEDURE — 71045 X-RAY EXAM CHEST 1 VIEW: CPT

## 2025-08-08 ASSESSMENT — ENCOUNTER SYMPTOMS
FEVER: 0
MYALGIAS: 1
ABDOMINAL PAIN: 0
VOMITING: 0
CHILLS: 0
SENSORY CHANGE: 0
NERVOUS/ANXIOUS: 0
NAUSEA: 0

## 2025-08-08 ASSESSMENT — PATIENT HEALTH QUESTIONNAIRE - PHQ9
1. LITTLE INTEREST OR PLEASURE IN DOING THINGS: NOT AT ALL
2. FEELING DOWN, DEPRESSED, IRRITABLE, OR HOPELESS: NOT AT ALL
SUM OF ALL RESPONSES TO PHQ9 QUESTIONS 1 AND 2: 0

## 2025-08-08 ASSESSMENT — PAIN DESCRIPTION - PAIN TYPE
TYPE: ACUTE PAIN

## 2025-08-08 NOTE — CARE PLAN
The patient is Stable - Low risk of patient condition declining or worsening    Shift Goals  Clinical Goals: pain management, safety, mobility, rest  Patient Goals: pain control, rest  Family Goals: DYLAN    Progress made toward(s) clinical / shift goals:    Problem: Knowledge Deficit - Standard  Goal: Patient and family/care givers will demonstrate understanding of plan of care, disease process/condition, diagnostic tests and medications  Outcome: Progressing     Problem: Skin Integrity  Goal: Skin integrity is maintained or improved  Outcome: Progressing     Problem: Fall Risk  Goal: Patient will remain free from falls  Outcome: Progressing     Problem: Pain - Standard  Goal: Alleviation of pain or a reduction in pain to the patient’s comfort goal  Outcome: Progressing       Patient is not progressing towards the following goals:
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
## 2025-08-08 NOTE — CARE PLAN
The patient is Stable - Low risk of patient condition declining or worsening    Shift Goals  Clinical Goals: pain control, patient comfort and safety  Patient Goals: pain control  Family Goals: not currently present    Progress made toward(s) clinical / shift goals:  Fall precautions remain in place. Patient calls appropriately for assistance. Pain has been medicated per MAR.     Patient is not progressing towards the following goals: Pending DC clearance and placement.    Problem: Knowledge Deficit - Standard  Goal: Patient and family/care givers will demonstrate understanding of plan of care, disease process/condition, diagnostic tests and medications  Outcome: Progressing     Problem: Skin Integrity  Goal: Skin integrity is maintained or improved  Outcome: Progressing     Problem: Fall Risk  Goal: Patient will remain free from falls  Outcome: Progressing     Problem: Pain - Standard  Goal: Alleviation of pain or a reduction in pain to the patient’s comfort goal  Outcome: Progressing

## 2025-08-08 NOTE — PROGRESS NOTES
Bedside report received.  Assessment complete.  A&O x 4. Patient calls appropriately.  Patient ambulates with x2 assist. Bed alarm on.   Patient has 7/10 pain. Patient declines interventions at this time.  Denies N&V. Tolerating regular diet.  + void, + flatus, + BM.  Patient denies SOB.  Review plan with of care with patient. Call light and personal belongings within reach. Hourly rounding in place. All needs met at this time.
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
## 2025-08-08 NOTE — DISCHARGE PLANNING
DPA left voicemail for Liudmila in admissions at [facility] for call back regarding pt referral.     1030  DPA called [facility] to follow up on pt referral but unable to leave voicemail.     1223  SHARIF spoke with Sierra at [facility] and was asked to fax recent hemoglobin lab and OT notes. DPA hard faxed these to [phone].    1432  DPA called [facility] and pt referral was not received. DPA will resend referral by hard fax to [phone].

## 2025-08-08 NOTE — DISCHARGE PLANNING
"Care Transition Team Discharge Planning    Anticipated Discharge Information  Discharge Disposition: Disch to a long term care facility (64)              Discharge Plan:  LTAC vs SNF    Pt was discussed in IDT rounds with Neetu COATES.    Pt is still pending LTAC/ vs SNF acceptance.  LTAC/SNF referral expanded in [facility] and [place].    Per Vianney, Liaison with [facility] , [facility] is considering.  Left a message to Vianney and requested a return call to follow up on referral/acceptance.      Addendum: 13:00 Spoke with Kika /Pt S Sister , explained that Pt is still pending SNF/LTAC acceptance.    When asked if she can take Pt home , She said \" NO\". \"My  does not allow him at out property\". They live in [place]. Informed HCM Leader.     Admissions at [facility] in [place] requested for OT notes and Labs , faxed by Heather RAY    CM to continue to assist with discharge as needed.      "
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
## 2025-08-09 ENCOUNTER — APPOINTMENT (OUTPATIENT)
Dept: RADIOLOGY | Facility: MEDICAL CENTER | Age: 65
DRG: 957 | End: 2025-08-09
Payer: COMMERCIAL

## 2025-08-09 PROCEDURE — 71045 X-RAY EXAM CHEST 1 VIEW: CPT

## 2025-08-09 ASSESSMENT — PAIN DESCRIPTION - PAIN TYPE
TYPE: ACUTE PAIN
TYPE: ACUTE PAIN;SURGICAL PAIN
TYPE: ACUTE PAIN

## 2025-08-09 ASSESSMENT — ENCOUNTER SYMPTOMS
FEVER: 0
ABDOMINAL PAIN: 0
NAUSEA: 0
SENSORY CHANGE: 0
VOMITING: 0
MYALGIAS: 1
NERVOUS/ANXIOUS: 0
CHILLS: 0
BACK PAIN: 1

## 2025-08-09 NOTE — CARE PLAN
The patient is Stable - Low risk of patient condition declining or worsening    Shift Goals  Clinical Goals: pain control, encourage OOB activity, patient safety  Patient Goals: pain control  Family Goals: DYLAN    Progress made toward(s) clinical / shift goals:  Patient tolerated ambulation via wheelchair to and from restroom. Pain has been medicated per MAR. Fall precautions remain in place for patient safety.     Patient is not progressing towards the following goals: Pending DC placement.     Problem: Knowledge Deficit - Standard  Goal: Patient and family/care givers will demonstrate understanding of plan of care, disease process/condition, diagnostic tests and medications  Outcome: Progressing     Problem: Skin Integrity  Goal: Skin integrity is maintained or improved  Outcome: Progressing     Problem: Fall Risk  Goal: Patient will remain free from falls  Outcome: Progressing     Problem: Pain - Standard  Goal: Alleviation of pain or a reduction in pain to the patient’s comfort goal  Outcome: Progressing

## 2025-08-09 NOTE — CARE PLAN
The patient is Stable - Low risk of patient condition declining or worsening    Shift Goals  Clinical Goals: Pt pain will remain < 5 after an intervnetion this shift  Patient Goals: Rest  Family Goals: N/A    Progress made toward(s) clinical / shift goals:    Problem: Knowledge Deficit - Standard  Goal: Patient and family/care givers will demonstrate understanding of plan of care, disease process/condition, diagnostic tests and medications  Description: Target End Date:  1-3 days or as soon as patient condition allows    Document in Patient Education    1.  Patient and family/caregiver oriented to unit, equipment, visitation policy and means for communicating concern  2.  Complete/review Learning Assessment  3.  Assess knowledge level of disease process/condition, treatment plan, diagnostic tests and medications  4.  Explain disease process/condition, treatment plan, diagnostic tests and medications  Outcome: Progressing     Problem: Skin Integrity  Goal: Skin integrity is maintained or improved  Description: Target End Date:  Prior to discharge or change in level of care    Document interventions on Skin Risk/Kamar flowsheet groups and corresponding LDA    1.  Assess and monitor skin integrity, appearance and/or temperature  2.  Assess risk factors for impaired skin integrity and/or pressures ulcers  3.  Implement precautions to protect skin integrity in collaboration with interdisciplinary team  4.  Implement pressure ulcer prevention protocol if at risk for skin breakdown  5.  Confirm wound care consult if at risk for skin breakdown  6.  Ensure patient use of pressure relieving devices  (Low air loss bed, waffle overlay, heel protectors, ROHO cushion, etc)  Outcome: Progressing     Problem: Fall Risk  Goal: Patient will remain free from falls  Description: Target End Date:  Prior to discharge or change in level of care    Document interventions on the Chioma Swartz Fall Risk Assessment    1.  Assess for fall risk  factors  2.  Implement fall precautions  Outcome: Progressing     Problem: Pain - Standard  Goal: Alleviation of pain or a reduction in pain to the patient’s comfort goal  Description: Target End Date:  Prior to discharge or change in level of care    Document on Vitals flowsheet    1.  Document pain using the appropriate pain scale per order or unit policy  2.  Educate and implement non-pharmacologic comfort measures (i.e. relaxation, distraction, massage, cold/heat therapy, etc.)  3.  Pain management medications as ordered  4.  Reassess pain after pain med administration per policy  5.  If opiods administered assess patient's response to pain medication is appropriate per POSS sedation scale  6.  Follow pain management plan developed in collaboration with patient and interdisciplinary team (including palliative care or pain specialists if applicable)  Outcome: Progressing       Patient is not progressing towards the following goals:

## 2025-08-09 NOTE — PROGRESS NOTES
Bedside report received. Assessment completed. Pt is A&O x 4 and reports 7/10 pain. Pt is on RA. Bed alarm in place and call light in reach.
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
## 2025-08-10 ENCOUNTER — APPOINTMENT (OUTPATIENT)
Dept: RADIOLOGY | Facility: MEDICAL CENTER | Age: 65
DRG: 957 | End: 2025-08-10
Payer: COMMERCIAL

## 2025-08-10 PROCEDURE — 71045 X-RAY EXAM CHEST 1 VIEW: CPT

## 2025-08-10 ASSESSMENT — PAIN DESCRIPTION - PAIN TYPE
TYPE: ACUTE PAIN
TYPE: ACUTE PAIN;SURGICAL PAIN
TYPE: ACUTE PAIN
TYPE: ACUTE PAIN;SURGICAL PAIN
TYPE: ACUTE PAIN
TYPE: ACUTE PAIN;SURGICAL PAIN
TYPE: ACUTE PAIN;SURGICAL PAIN

## 2025-08-10 ASSESSMENT — ENCOUNTER SYMPTOMS
VOMITING: 0
BACK PAIN: 1
NERVOUS/ANXIOUS: 0
MYALGIAS: 1
FEVER: 0
CHILLS: 0
SENSORY CHANGE: 0
NAUSEA: 0
ABDOMINAL PAIN: 0

## 2025-08-10 NOTE — CARE PLAN
The patient is Stable - Low risk of patient condition declining or worsening    Shift Goals  Clinical Goals: provide medicaiton PRN for pain, provide for comfort, encourage pulmonary hygiene  Patient Goals: rest  Family Goals: DYLAN    Progress made toward(s) clinical / shift goals:  medication provided per mar, vitals monitored per policy, education provided regarding IS use and pulmonary hygiene.     Patient is not progressing towards the following goals:

## 2025-08-10 NOTE — PROGRESS NOTES
"Bedside report received.  Assessment complete.  A&O x 4. Patient calls appropriately.  Patient ambulates with X2 assist, Non-weight bearing on LLE. Bed alarm on.   Patient has 4/10 pain. Patient medicated per MAR.  Denies N&V. Tolerating regular diet.  Skin per flowsheets..  + void, + flatus, + BM.  Patient denies SOB.  SCD's refused.  Patient calm and cooperative with staff and POC at this time.  Review plan with of care with patient. Call light and personal belongings within reach. Hourly rounding in place. All needs met at this time.    /65   Pulse 93   Temp 37.4 °C (99.3 °F) (Temporal)   Resp 16   Ht 1.803 m (5' 10.98\")   Wt 72.3 kg (159 lb 6.3 oz)   SpO2 90%   BMI 22.24 kg/m²     "

## 2025-08-10 NOTE — PROGRESS NOTES
Patient refused all morning medications at this time. Education was provided regarding the use of each medication, however patient continued to decline medications at this time.
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
## 2025-08-10 NOTE — PROGRESS NOTES
Bedside report received from NOC RN; assumed care. Patient sleeping this morning at change of shift. Assessment partially completed. Patient not talking during assessment. Patient alert and using finger gestures. VSS. 95% on RA.  Patient not responding to questions posed. Medicated for pain; see MAR.  Abdomen round. Normoactive BS x 4 noted. MLI with staples, [place]. Former CT to right side of chest with gauze/tegaderm, needing to be changed. Patient refusing above dressing changes/staple removal at this time. Urinal replaced on bedside table. Right leg elevated on pillows. + void. LBM 8/9 per report. Patient tolerating regular diet, snacking frequently. Patient up x 1 pivot transfer with CAM boot. Discussed plan of care with patient. All questions answered.  Moderate fall risk. Bed frame alarm engaged. Bed in locked/lowest position.  Call light/personal belongings within reach.  All needs met, patient sitting upright in bed at present time.

## 2025-08-10 NOTE — DISCHARGE PLANNING
Care Transition Team Discharge Planning    Anticipated Discharge Information  Discharge Disposition: Disch to a long term care facility (64)              Discharge Plan:  LTAC vs SNF    Pt was discussed in IDT rounds with Neetu COATES.    Per rounds Pt is medically cleared for post acute placement.  Pt was declined at [facility] for behavior.  Per Rosalie , Liaison she can ask her  to review the referral if Pt will participate with Therapy and Pt remains calm and compliant.    Pt is pending SNF /LTAC acceptance in [place] , there is no  accepting facility in [facility].     Requested SHARIF Mckinney to follow up with the referrals sent to facilities in [place].      This RN CM called [facility] in  [place]   [address] [place] [address]  Phone: [phone]    Per Shara , Admissions at [facility]  declined because Primary Insurance is Misc Acc[employer] Liability and Miguel is Sec .

## 2025-08-11 ENCOUNTER — APPOINTMENT (OUTPATIENT)
Dept: RADIOLOGY | Facility: MEDICAL CENTER | Age: 65
DRG: 957 | End: 2025-08-11
Attending: PHYSICIAN ASSISTANT
Payer: COMMERCIAL

## 2025-08-11 PROBLEM — J86.9 EMPYEMA (HCC): Status: ACTIVE | Noted: 2025-08-11

## 2025-08-11 PROBLEM — R07.9 CHEST PAIN: Status: ACTIVE | Noted: 2025-08-11

## 2025-08-11 PROCEDURE — 71275 CT ANGIOGRAPHY CHEST: CPT

## 2025-08-11 PROCEDURE — 71045 X-RAY EXAM CHEST 1 VIEW: CPT

## 2025-08-11 ASSESSMENT — ENCOUNTER SYMPTOMS
FEVER: 0
BACK PAIN: 1
NAUSEA: 0
ABDOMINAL PAIN: 0
SENSORY CHANGE: 0
CHILLS: 0
VOMITING: 0
NERVOUS/ANXIOUS: 0
MYALGIAS: 1

## 2025-08-11 ASSESSMENT — PAIN DESCRIPTION - PAIN TYPE
TYPE: ACUTE PAIN
TYPE: ACUTE PAIN;SURGICAL PAIN
TYPE: ACUTE PAIN

## 2025-08-11 NOTE — ASSESSMENT & PLAN NOTE
CTA for chest pain with loculated appearing right pleural effusion in upper and lower chest.  8/13 IR chest tube placed in upper loculated collection.  Aggressive pulmonary hygiene.   Monitor chest radiographs.

## 2025-08-11 NOTE — CARE PLAN
The patient is Stable - Low risk of patient condition declining or worsening    Shift Goals  Clinical Goals: wean O2, monitor hemodynamics, pain control  Patient Goals: rest  Family Goals: DYLAN    Progress made toward(s) clinical / shift goals:      Patient will remain free from falls with use of bed alarm, appropriate use of DME, and collaboration with PT. Patient's pain will be < 5 for duration of shift with use of PRN and scheduled pain medication in addition to clustering care and providing a calm environment.     Problem: Fall Risk  Goal: Patient will remain free from falls  Outcome: Progressing     Problem: Pain - Standard  Goal: Alleviation of pain or a reduction in pain to the patient’s comfort goal  Outcome: Progressing

## 2025-08-11 NOTE — CARE PLAN
The patient is Stable - Low risk of patient condition declining or worsening    Shift Goals  Clinical Goals: increase OOB activity, provide for comfort.  Patient Goals: rest  Family Goals: DYLAN    Progress made toward(s) clinical / shift goals:  vitals monitored PRN and per policy, medication provided per MAR. Changes in condition escalated appropriately   Patient is not progressing towards the following goals:
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
## 2025-08-11 NOTE — DISCHARGE PLANNING
"Care Transition Team Discharge Planning    Anticipated Discharge Information  Discharge Disposition: Disch to a long term care facility (64)              Discharge Plan:  LTAC vs SNF    Pt was discussed in IDT rounds with Neetu COATES.    Per Surgery notes:    \" - IR consult and treat for potential tap of empyema, NPO ahead of potential procedure   - serial troponins \"  So not medically cleared now    Will send a copy of DC Summary to [facility] when Pt is medically cleared. Informed Neetu COATES.    "

## 2025-08-11 NOTE — CARE PLAN
The patient is Stable - Low risk of patient condition declining or worsening    Shift Goals  Clinical Goals: Pain/nausea control, increase mobility, staple removal, POC  Patient Goals: DYLAN  Family Goals: DYLAN    Progress made toward(s) clinical / shift goals:  Medicated for pain; see MAR. Discussed multimodals and purpose; administered; see MAR. Former CT dressings changed. Staples removed from MLI per active order; patent tolerated. Patient gradually allowed this RN to perform certain interventions/assessments as shift progressed.     Patient is not progressing towards the following goals:    Problem: Mobility  Goal: Patient's capacity to carry out activities will improve  Outcome: Not Progressing  Patient turning in bed. Patient refused to get OOB unless he was allowed to go outside in  on own. Patient requesting lights to remain off while in the room. Light on for approximately 2 hours today. Education provided.

## 2025-08-11 NOTE — PROGRESS NOTES
"Bedside report received.  Assessment complete.  A&O x 4. Patient calls appropriately.  Patient Non-weight bearing on LLE. Bed alarm on.   Patient has 4/10 pain. Patient declines medication at this time  Denies N&V. Tolerating regular diet.  Skin assessment declined.  + void, + flatus, + BM.  Patient denies SOB.  Patient calm and cooperative with staff and POC at this time.  Review plan with of care with patient. Call light and personal belongings within reach. Hourly rounding in place. All needs met at this time.    /69   Pulse 98   Temp 37.2 °C (99 °F) (Temporal)   Resp 17   Ht 1.803 m (5' 10.98\")   Wt 72.3 kg (159 lb 6.3 oz)   SpO2 96%   BMI 22.24 kg/m²     "
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
## 2025-08-11 NOTE — ASSESSMENT & PLAN NOTE
8/11 chest pain overnight.  CTA negative for PE.  ECG with ST elevation consistent with early repolarization per cardiology.  Serial troponins negative.  Gracy La M.D., Cardiology signed off.

## 2025-08-11 NOTE — THERAPY
Occupational Therapy Contact Note    Patient Name: Jairo Abreu  Age:  65 y.o., Sex:  person  Medical Record #: [id number]  Today's Date: 8/11/2025    Discussed missed therapy with Oneil       08/11/25 6933   Interdisciplinary Plan of Care Collaboration   Collaboration Comments OT tx attempted in am & pm.  Pt has been irritable most of the day & unwilling to participate in OOB ADL's
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
## 2025-08-11 NOTE — DISCHARGE PLANNING
SHARIF spoke with Sierra at [facility] and pt is still being considered. Sierra will call SHARIF back.    1514  SHARIF spoke with Viki at [facility] and pt is being considered. DC summary is needed for further consideration. Viki was given SHARIF's direct phone number for call back.

## 2025-08-11 NOTE — PROGRESS NOTES
Bedside report received.  Assessment complete.  A&O x 4. Patient calls appropriately. Patient agreeable and pleasant.   Patient ambulates with x1 assist. Bed alarm on.   Patient has 0/10 pain. Patient medicated per MAR.  Denies N&V. Tolerating regular diet.  Surgical x3 old CT sites DIP, MLI with steristrips, RLE lap sites AUSTIN, L heel DTI AUSTIN.  + void, + flatus, - BM.  Patient denies SOB.  SCD's refused.   Review plan with of care with patient. Call light and personal belongings within reach. Hourly rounding in place. All needs met at this time.

## 2025-08-12 ENCOUNTER — APPOINTMENT (OUTPATIENT)
Dept: RADIOLOGY | Facility: MEDICAL CENTER | Age: 65
DRG: 957 | End: 2025-08-12
Payer: COMMERCIAL

## 2025-08-12 PROCEDURE — 71045 X-RAY EXAM CHEST 1 VIEW: CPT

## 2025-08-12 ASSESSMENT — PAIN DESCRIPTION - PAIN TYPE
TYPE: ACUTE PAIN
TYPE: ACUTE PAIN;SURGICAL PAIN
TYPE: ACUTE PAIN

## 2025-08-12 ASSESSMENT — ENCOUNTER SYMPTOMS
NERVOUS/ANXIOUS: 0
ABDOMINAL PAIN: 0
VOMITING: 0
NAUSEA: 0
MYALGIAS: 1
BACK PAIN: 1
FEVER: 0
SENSORY CHANGE: 0
CHILLS: 0

## 2025-08-12 NOTE — DISCHARGE PLANNING
Care Transition Team Discharge Planning    Anticipated Discharge Information  Discharge Disposition: D/T to SNF with Medicare cert in anticipation of skilled care (03)              Discharge Plan:  SNF    Pt was discussed in IDT rounds with Neetu COATES.      Pt is not medically cleared now, Pt will be here for treatment of  Empyema.    Pt has been accepted at [facility] in [place], pending medical clearance, bed and Insurance auth.

## 2025-08-12 NOTE — PROGRESS NOTES
Bedside report received.  Assessment complete.  A&O x 4. Patient calls appropriately.   Patient ambulates with x1 assist. Bed alarm on.   Patient has 7/10 pain. Patient medicated per MAR.  Denies N&V. Tolerating NPO diet.  Surgical x3 old CT sites DIP, MLI with steristrips, RLE lap sites AUSTIN, L heel DTI AUSTIN.  + void, + flatus, + BM.  Patient denies SOB.  SCD's refused.   Review plan with of care with patient. Call light and personal belongings within reach. Hourly rounding in place. All needs met at this time.

## 2025-08-12 NOTE — DISCHARGE PLANNING
Sierra from [facility] called and pt has been accepted. DPA will contact her when pt is ready for discharge.

## 2025-08-12 NOTE — CONSULTS
Brief Behavioral Health Care Note      IP psychiatry order for a re-consultation received due to vague suicidal implications without a direct threat or reported plan or intent. Attempted in assess patient again, however he is currently in a procedure. Will return at a later time.    Thank you,    Brigid Goodman, Ph.D>, [facility]
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
## 2025-08-12 NOTE — CARE PLAN
The patient is Stable - Low risk of patient condition declining or worsening    Shift Goals  Clinical Goals: wean O2, pain control, compliance with POC  Patient Goals: pain control  Family Goals: DYLAN    Progress made toward(s) clinical / shift goals:      Patient will remain free from falls with use of bed alarm, appropriate use of call light, and frequent toileting/rounding. Patient's pain will be < 6 for duration of shift with use of PRNs, compliance with scheduled meds, and encouraging family/friends to remain at bedside.     Problem: Fall Risk  Goal: Patient will remain free from falls  Outcome: Progressing     Problem: Pain - Standard  Goal: Alleviation of pain or a reduction in pain to the patient’s comfort goal  Outcome: Progressing

## 2025-08-12 NOTE — PROGRESS NOTES
Patient refused full assessment at this time. Education provided regarding importance of thorough assessments by nursing staff. Patient continued to decline to allow this RN to assess.

## 2025-08-12 NOTE — PROGRESS NOTES
"Patient made statements hinting at wanting to engage in self harm. This RN attempted to follow up with patient regarding statements and patient declined giving clear answers. This RN again asked patient if he was considering self harm or desiring to end his life and patient replied \"well you should know.\" This RN clarified with patient that comments relating to self harm or attempting to hard himself are taken seriously and need to be followed up with. The patient then replied \"well then I wont talk to you.\" After this patient declined replying to RN.    NOC provider notified. New orders for IP Psych eval placed. Telesitting initiated.       "
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
## 2025-08-13 ENCOUNTER — APPOINTMENT (OUTPATIENT)
Dept: RADIOLOGY | Facility: MEDICAL CENTER | Age: 65
DRG: 957 | End: 2025-08-13
Payer: COMMERCIAL

## 2025-08-13 PROBLEM — T14.90XA TRAUMA: Status: RESOLVED | Noted: 2025-07-23 | Resolved: 2025-08-13

## 2025-08-13 PROBLEM — D72.829 LEUKOCYTOSIS: Status: RESOLVED | Noted: 2025-08-04 | Resolved: 2025-08-13

## 2025-08-13 PROBLEM — K66.8 PNEUMOPERITONEUM: Status: RESOLVED | Noted: 2025-07-23 | Resolved: 2025-08-13

## 2025-08-13 PROCEDURE — 71045 X-RAY EXAM CHEST 1 VIEW: CPT

## 2025-08-13 ASSESSMENT — ENCOUNTER SYMPTOMS
MYALGIAS: 1
SENSORY CHANGE: 0
VOMITING: 0
FEVER: 0
COUGH: 0
NAUSEA: 0
SHORTNESS OF BREATH: 0
TINGLING: 0
FOCAL WEAKNESS: 0
CHILLS: 0
ABDOMINAL PAIN: 0

## 2025-08-13 ASSESSMENT — PAIN DESCRIPTION - PAIN TYPE
TYPE: ACUTE PAIN;SURGICAL PAIN
TYPE: ACUTE PAIN
TYPE: ACUTE PAIN;SURGICAL PAIN
TYPE: ACUTE PAIN

## 2025-08-13 NOTE — CARE PLAN
Problem: Knowledge Deficit - Standard  Goal: Patient and family/care givers will demonstrate understanding of plan of care, disease process/condition, diagnostic tests and medications  Description: Target End Date:  1-3 days or as soon as patient condition allows    Document in Patient Education    1.  Patient and family/caregiver oriented to unit, equipment, visitation policy and means for communicating concern  2.  Complete/review Learning Assessment  3.  Assess knowledge level of disease process/condition, treatment plan, diagnostic tests and medications  4.  Explain disease process/condition, treatment plan, diagnostic tests and medications  8/13/2025 0931 by Ginger Glasgow R.N.  Outcome: Progressing  8/13/2025 0909 by Ginger Glasgow R.N.  Outcome: Progressing     Problem: Skin Integrity  Goal: Skin integrity is maintained or improved  Description: Target End Date:  Prior to discharge or change in level of care    Document interventions on Skin Risk/Kamar flowsheet groups and corresponding LDA    1.  Assess and monitor skin integrity, appearance and/or temperature  2.  Assess risk factors for impaired skin integrity and/or pressures ulcers  3.  Implement precautions to protect skin integrity in collaboration with interdisciplinary team  4.  Implement pressure ulcer prevention protocol if at risk for skin breakdown  5.  Confirm wound care consult if at risk for skin breakdown  6.  Ensure patient use of pressure relieving devices  (Low air loss bed, waffle overlay, heel protectors, ROHO cushion, etc)  8/13/2025 0931 by Ginger Glasgow RBettyN.  Outcome: Progressing  8/13/2025 0909 by PARI MobleyN.  Outcome: Progressing     Problem: Fall Risk  Goal: Patient will remain free from falls  Description: Target End Date:  Prior to discharge or change in level of care    Document interventions on the Bedollasushma Swartz Fall Risk Assessment    1.  Assess for fall risk factors  2.  Implement fall  precautions  8/13/2025 0931 by Ginger Glasgow RBettyNBetty  Outcome: Progressing  8/13/2025 0909 by Ginger Glasgow R.N.  Outcome: Progressing     Problem: Pain - Standard  Goal: Alleviation of pain or a reduction in pain to the patient’s comfort goal  Description: Target End Date:  Prior to discharge or change in level of care    Document on Vitals flowsheet    1.  Document pain using the appropriate pain scale per order or unit policy  2.  Educate and implement non-pharmacologic comfort measures (i.e. relaxation, distraction, massage, cold/heat therapy, etc.)  3.  Pain management medications as ordered  4.  Reassess pain after pain med administration per policy  5.  If opiods administered assess patient's response to pain medication is appropriate per POSS sedation scale  6.  Follow pain management plan developed in collaboration with patient and interdisciplinary team (including palliative care or pain specialists if applicable)  Outcome: Progressing   The patient is Stable - Low risk of patient condition declining or worsening    Shift Goals  Clinical Goals: mobility and skin education provided  Patient Goals: be left alone  Family Goals: DYLAN    Progress made toward(s) clinical / shift goals:
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
## 2025-08-13 NOTE — CARE PLAN
The patient is Stable - Low risk of patient condition declining or worsening    Shift Goals  Clinical Goals: mobility and skin education provided  Patient Goals: be left alone  Family Goals: DYLAN    Progress made toward(s) clinical / shift goals:        Problem: Knowledge Deficit - Standard  Goal: Patient and family/care givers will demonstrate understanding of plan of care, disease process/condition, diagnostic tests and medications  Description: Target End Date:  1-3 days or as soon as patient condition allows    Document in Patient Education    1.  Patient and family/caregiver oriented to unit, equipment, visitation policy and means for communicating concern  2.  Complete/review Learning Assessment  3.  Assess knowledge level of disease process/condition, treatment plan, diagnostic tests and medications  4.  Explain disease process/condition, treatment plan, diagnostic tests and medications  Outcome: Progressing     Problem: Skin Integrity  Goal: Skin integrity is maintained or improved  Description: Target End Date:  Prior to discharge or change in level of care    Document interventions on Skin Risk/Kamar flowsheet groups and corresponding LDA    1.  Assess and monitor skin integrity, appearance and/or temperature  2.  Assess risk factors for impaired skin integrity and/or pressures ulcers  3.  Implement precautions to protect skin integrity in collaboration with interdisciplinary team  4.  Implement pressure ulcer prevention protocol if at risk for skin breakdown  5.  Confirm wound care consult if at risk for skin breakdown  6.  Ensure patient use of pressure relieving devices  (Low air loss bed, waffle overlay, heel protectors, ROHO cushion, etc)  Outcome: Progressing     Problem: Fall Risk  Goal: Patient will remain free from falls  Description: Target End Date:  Prior to discharge or change in level of care    Document interventions on the Chioma Swartz Fall Risk Assessment    1.  Assess for fall risk  factors  2.  Implement fall precautions  Outcome: Progressing

## 2025-08-13 NOTE — OR SURGEON
Immediate Post- Operative Note        Findings: Right loculated pleural effusions      Procedure(s): Right chest tube placement      Estimated Blood Loss: Less than 5 ml        Complications: None            8/13/2025     10:49 AM     Hernan Barrera M.D.
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
## 2025-08-13 NOTE — PROGRESS NOTES
Patient refused all assessments and medications from this RN at this time, education provided regarding policy and importance of assessments to maintain safety. Patient continues to decline care from this RN at this time.

## 2025-08-13 NOTE — PROGRESS NOTES
RN to room due to patient yelling at CNA, this RN attempted to speak with patient regarding respectful communication however patient continued yelling over both RN and CNA. Security called to reset patient expectations. Charge RN notified.

## 2025-08-13 NOTE — PROGRESS NOTES
Patient refused all dressing changes this AM including old mepilex to sacrum, and bilateral heels..

## 2025-08-13 NOTE — PROGRESS NOTES
This RN attempted again to complete assessment on patient at this time. Patient continues to decline assessments of wounds at this time. This RN again provided education regarding importance of monitoring wounds for signs of breakdown. Patient verbalizes understanding however patient does not want mepilex dressings removed or pressure points assessed.

## 2025-08-13 NOTE — PROGRESS NOTES
Pt reporting new onset epigastric pain and heart burn. Reports to this RN that he has had a peptic ulcer for years. NOC provider notified.

## 2025-08-13 NOTE — PROGRESS NOTES
Pt presents to CT4. Patient was consented by MD at bedside, confirmed by this RN and consent at bedside. Pt transferred to CT table in prone position. Patient underwent a right chest tube placement by Dr. Barrera. Procedure site was marked by MD and verified using imaging guidance. Pt placed on monitor, prepped and draped in a sterile fashion. Vitals were taken every 5 minutes and remained stable during procedure (see doc flow sheet for results). CO2 waveform capnography was monitored and remained WNL throughout procedure.  Pt transported by bed with RN to T410.     Specimen: 5 mL Pleural fluid hand delivered to lab.    Flexima APDL locking drainage catheter 10F  REF: [id number]  LOT: [id number]  EXP: 2028-06-24

## 2025-08-13 NOTE — THERAPY
"Occupational Therapy Contact Note    Patient Name: Jairo Abreu  Age:  65 y.o., Sex:  person  Medical Record #: [id number]  Today's Date: 8/13/2025    Discussed missed therapy with Oneil       08/13/25 1521   Other Treatments   Other Treatments Provided OT tx attempted, pt had chest tube placed this am.  Pt irritable in the afternoon & was demanding to see a doctor to look at his X-ray to help him understand what's going on with him.  Attempted to explain to pt his injuries & why he needed a chest tube & the importance of getting OOB to enhance his recovery.  Pt stated \"I'm not doing anything until I see my X-rays\"       "
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
## 2025-08-13 NOTE — PROGRESS NOTES
Patient refusing all medications and assessments at this time. Mepilex on sacrum overdue for change. Heels not assessed. This RN attempted to find out from patient why he was resistant to this RN's attempts to perform assessments or provide medication however patient placed blanket over face and refused to respond.

## 2025-08-14 ENCOUNTER — APPOINTMENT (OUTPATIENT)
Dept: RADIOLOGY | Facility: MEDICAL CENTER | Age: 65
DRG: 957 | End: 2025-08-14
Payer: COMMERCIAL

## 2025-08-14 PROCEDURE — 71045 X-RAY EXAM CHEST 1 VIEW: CPT

## 2025-08-14 ASSESSMENT — ENCOUNTER SYMPTOMS
ABDOMINAL PAIN: 0
BACK PAIN: 1
SHORTNESS OF BREATH: 0
CHILLS: 0
NAUSEA: 0
MYALGIAS: 1
FEVER: 0
COUGH: 0
VOMITING: 0

## 2025-08-14 ASSESSMENT — PAIN DESCRIPTION - PAIN TYPE: TYPE: ACUTE PAIN;SURGICAL PAIN

## 2025-08-14 NOTE — PROGRESS NOTES
DATE: 8/14/2025    INTERVAL EVENTS:  Advised by hospital nursing personnel that the patient wishes to leave AGAINST MEDICAL ADVICE (AMA).    ASSESSMENT AND PLAN:     I have seen and evaluated the patient.  It is my assessment that the patient has appropriate decisional capacity to make informed choices regarding his medical care. I had a lengthy discussion with the patient regarding the severity of his illness and the potential consequences of leaving AMA. Treatment options were discussed and the potential risks associated with not adhering to treatment were explained. I have advised the patient about the risks, benefits, and alternatives of refusing further care and/or treatment.    The patient insists on leaving AMA. I have discussed the various scenarios that would prompt immediate return to the emergency department and stressed that the patient may return to the hospital at any time.  Outpatient follow-up and instructions provided.  The patient's attending providers notified.       ____________________________________     Kenya Mccall P.A.-C.    DD: 8/14/2025  1:20 PM
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
## 2025-08-14 NOTE — PROGRESS NOTES
"This RN attempted to assess patient this morning.   Patient began screaming at this RN stating \"I'm refusing, I don't want you to talk to me all day.\" This RN attempted to deescalate the situation and educate the patient on the need for me to assess his chest tube. The patient began yelling stating \"I don't give a fuck if I fall over and die right now. I don't want you to touch me, do you fucking speak english, does this make sense to you?\"  This RN paged security to reset expectations.   "
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
## 2025-08-14 NOTE — CARE PLAN
The patient is Stable - Low risk of patient condition declining or worsening    Shift Goals  Clinical Goals: monitor chest tube and site, monitor pulmonary hygiene, therapeutic communication. provide for comfort  Patient Goals: to be left alone  Family Goals: DYLAN    Progress made toward(s) clinical / shift goals:  chest tube assessed multiple time throughout shift. Education provided PRN. Patient refusing many interventions, refusing education, refusing medication. Education attempted.     Patient is not progressing towards the following goals:

## 2025-08-14 NOTE — PROGRESS NOTES
"Patient complaining of pain at beginning of shift, medication offered. Patient refused stating, \"no I would rather suffer than take anything from you.\" Therapeutic communication attempted. Patient then asked for ice cream. Ice cream provided by this RN. Attempt was made to perform a full assessment and skin check. Patient declined stating, \"no I don't trust you, you will just twist anything I say.\" This RN attempted to speak with patient regarding policies and RN responsibilities however patient was resistant to explanation stating, \"well that's your opinion and I have a different opinion.\" This RN inquired if there was anything else patient required at this time. Patient responded \"not from you.\"    "

## 2025-08-14 NOTE — PROGRESS NOTES
BRIEF IR NOTE:    Patient has elected to leave against medical advice.    I discussed risks of removing chest tube prior to resolution of persistent loculated right pleural effusion. I discovered that the patient had once again disconnected his own chest tube from the 2-way-valve and PleurEvac. I recommended that the patient stay for f/u CXR s/p chest tube removal. Patient declines at this time, despite conversation re: risk of pneumothorax due to open chest tube for unspecified amount of time. CXR ordered in the case the patient changes his mind. I discussed patient with Surgical PA-C and hospital nursing staff. Chest tube removed by me at patient's and IDT's request.    IR Signing Off.

## 2025-08-14 NOTE — PROGRESS NOTES
Jairo Abreu has chosen to leave the hospital against medical advice. The attending physician has not discharged the patient. The provider is aware that the patient is leaving against medical advice. Patient expresses understanding of the risks of leaving the hospital and benefits of admission including but not limited to, the availability and proximity of nurses, physicians, monitoring, diagnostic testing, treatment, and a safe discharge plan. The patient had the opportunity to ask questions about their medical condition and recommended treatment.  Patient is aware that they may return for care at any time.

## 2025-08-14 NOTE — PROGRESS NOTES
Patient refusing all interventions at this time, including but not limited to, medications, HOB orders, SCD orders.  Patient stating that he wants to leave now, and doesn't care about the consequences if he does leave.   JAXON Zambrano notified. IR paged for chest tube removal.

## 2025-08-14 NOTE — PROGRESS NOTES
RN responded to bed alarm. Upon arrival , patient was seated in wheelchair and using duffle bag to offset weight on the strip alarm in bed. Patient had disconnected IR chest tube from Pleura-vac. RN provided education regarding risks and importance of not manipulating chest tube. Tubing cleansed and reattached. JAXON Zambrano notified. Patient stated RN was no longer needed in room. Call light placed within reach.

## 2025-08-14 NOTE — DISCHARGE INSTRUCTIONS
You are being discharged against medical advice.     Please remain:  Non weight bearing to Right to Leg  Weight bearing as tolerated to Left leg    Please follow up

## 2025-08-15 ENCOUNTER — HOSPITAL ENCOUNTER (INPATIENT)
Facility: MEDICAL CENTER | Age: 65
LOS: 6 days | DRG: 183 | End: 2025-08-21
Attending: EMERGENCY MEDICINE | Admitting: INTERNAL MEDICINE
Payer: OTHER MISCELLANEOUS

## 2025-08-15 ENCOUNTER — APPOINTMENT (OUTPATIENT)
Dept: RADIOLOGY | Facility: MEDICAL CENTER | Age: 65
DRG: 183 | End: 2025-08-15
Attending: INTERNAL MEDICINE
Payer: OTHER MISCELLANEOUS

## 2025-08-15 ENCOUNTER — APPOINTMENT (OUTPATIENT)
Dept: RADIOLOGY | Facility: MEDICAL CENTER | Age: 65
DRG: 183 | End: 2025-08-15
Attending: EMERGENCY MEDICINE
Payer: OTHER MISCELLANEOUS

## 2025-08-15 DIAGNOSIS — R79.89 ELEVATED TROPONIN: ICD-10-CM

## 2025-08-15 DIAGNOSIS — D64.9 ANEMIA, UNSPECIFIED TYPE: Primary | ICD-10-CM

## 2025-08-15 DIAGNOSIS — T14.90XA TRAUMA: ICD-10-CM

## 2025-08-15 DIAGNOSIS — N17.9 AKI (ACUTE KIDNEY INJURY) (HCC): ICD-10-CM

## 2025-08-15 DIAGNOSIS — R45.851 SUICIDAL IDEATION: ICD-10-CM

## 2025-08-15 DIAGNOSIS — R26.2 AMBULATORY DYSFUNCTION: ICD-10-CM

## 2025-08-15 DIAGNOSIS — R11.0 NAUSEA: ICD-10-CM

## 2025-08-15 PROBLEM — D75.839 THROMBOCYTOSIS: Status: ACTIVE | Noted: 2025-08-15

## 2025-08-15 PROBLEM — J96.01 ACUTE RESPIRATORY FAILURE WITH HYPOXIA (HCC): Status: ACTIVE | Noted: 2025-08-15

## 2025-08-15 PROBLEM — Z71.89 ACP (ADVANCE CARE PLANNING): Status: ACTIVE | Noted: 2025-08-15

## 2025-08-15 PROBLEM — I10 HYPERTENSION: Status: ACTIVE | Noted: 2025-08-15

## 2025-08-15 LAB
ALBUMIN SERPL BCP-MCNC: 3.3 G/DL (ref 3.2–4.9)
ALBUMIN/GLOB SERPL: 0.9 G/DL
ALP SERPL-CCNC: 165 U/L (ref 30–99)
ALT SERPL-CCNC: 29 U/L (ref 2–50)
ANION GAP SERPL CALC-SCNC: 11 MMOL/L (ref 7–16)
AST SERPL-CCNC: 44 U/L (ref 12–45)
BASOPHILS # BLD AUTO: 0.5 % (ref 0–1.8)
BASOPHILS # BLD: 0.05 K/UL (ref 0–0.12)
BILIRUB SERPL-MCNC: 0.5 MG/DL (ref 0.1–1.5)
BUN SERPL-MCNC: 24 MG/DL (ref 8–22)
CALCIUM ALBUM COR SERPL-MCNC: 9.7 MG/DL (ref 8.5–10.5)
CALCIUM SERPL-MCNC: 9.1 MG/DL (ref 8.5–10.5)
CHLORIDE SERPL-SCNC: 103 MMOL/L (ref 96–112)
CO2 SERPL-SCNC: 23 MMOL/L (ref 20–33)
CREAT SERPL-MCNC: 1.02 MG/DL (ref 0.5–1.4)
EKG IMPRESSION: NORMAL
EOSINOPHIL # BLD AUTO: 0.13 K/UL (ref 0–0.51)
EOSINOPHIL NFR BLD: 1.2 % (ref 0–6.9)
ERYTHROCYTE [DISTWIDTH] IN BLOOD BY AUTOMATED COUNT: 50.2 FL (ref 35.9–50)
GFR SERPLBLD CREATININE-BSD FMLA CKD-EPI: 82 ML/MIN/1.73 M 2
GLOBULIN SER CALC-MCNC: 3.6 G/DL (ref 1.9–3.5)
GLUCOSE SERPL-MCNC: 101 MG/DL (ref 65–99)
HCT VFR BLD AUTO: 30.4 % (ref 42–52)
HGB BLD-MCNC: 9.7 G/DL (ref 14–18)
IMM GRANULOCYTES # BLD AUTO: 0.21 K/UL (ref 0–0.11)
IMM GRANULOCYTES NFR BLD AUTO: 1.9 % (ref 0–0.9)
LYMPHOCYTES # BLD AUTO: 1.39 K/UL (ref 1–4.8)
LYMPHOCYTES NFR BLD: 12.7 % (ref 22–41)
MCH RBC QN AUTO: 29.8 PG (ref 27–33)
MCHC RBC AUTO-ENTMCNC: 31.9 G/DL (ref 32.3–36.5)
MCV RBC AUTO: 93.3 FL (ref 81.4–97.8)
MONOCYTES # BLD AUTO: 1.05 K/UL (ref 0–0.85)
MONOCYTES NFR BLD AUTO: 9.6 % (ref 0–13.4)
NEUTROPHILS # BLD AUTO: 8.14 K/UL (ref 1.82–7.42)
NEUTROPHILS NFR BLD: 74.1 % (ref 44–72)
NRBC # BLD AUTO: 0 K/UL
NRBC BLD-RTO: 0 /100 WBC (ref 0–0.2)
NT-PROBNP SERPL IA-MCNC: 114 PG/ML (ref 0–125)
PLATELET # BLD AUTO: 812 K/UL (ref 164–446)
PMV BLD AUTO: 9.3 FL (ref 9–12.9)
POTASSIUM SERPL-SCNC: 4.9 MMOL/L (ref 3.6–5.5)
PROCALCITONIN SERPL-MCNC: 0.12 NG/ML
PROT SERPL-MCNC: 6.9 G/DL (ref 6–8.2)
RBC # BLD AUTO: 3.26 M/UL (ref 4.7–6.1)
SODIUM SERPL-SCNC: 137 MMOL/L (ref 135–145)
TROPONIN T SERPL-MCNC: 32 NG/L (ref 6–19)
TROPONIN T SERPL-MCNC: 37 NG/L (ref 6–19)
WBC # BLD AUTO: 11 K/UL (ref 4.8–10.8)

## 2025-08-15 PROCEDURE — 99285 EMERGENCY DEPT VISIT HI MDM: CPT

## 2025-08-15 PROCEDURE — 94760 N-INVAS EAR/PLS OXIMETRY 1: CPT

## 2025-08-15 PROCEDURE — 84484 ASSAY OF TROPONIN QUANT: CPT

## 2025-08-15 PROCEDURE — 700111 HCHG RX REV CODE 636 W/ 250 OVERRIDE (IP): Mod: JZ | Performed by: INTERNAL MEDICINE

## 2025-08-15 PROCEDURE — 93005 ELECTROCARDIOGRAM TRACING: CPT | Mod: TC | Performed by: EMERGENCY MEDICINE

## 2025-08-15 PROCEDURE — 80053 COMPREHEN METABOLIC PANEL: CPT

## 2025-08-15 PROCEDURE — 84145 PROCALCITONIN (PCT): CPT

## 2025-08-15 PROCEDURE — 36415 COLL VENOUS BLD VENIPUNCTURE: CPT

## 2025-08-15 PROCEDURE — 71045 X-RAY EXAM CHEST 1 VIEW: CPT

## 2025-08-15 PROCEDURE — 770006 HCHG ROOM/CARE - MED/SURG/GYN SEMI*

## 2025-08-15 PROCEDURE — 83880 ASSAY OF NATRIURETIC PEPTIDE: CPT

## 2025-08-15 PROCEDURE — 700105 HCHG RX REV CODE 258: Performed by: INTERNAL MEDICINE

## 2025-08-15 PROCEDURE — 700111 HCHG RX REV CODE 636 W/ 250 OVERRIDE (IP): Mod: JZ

## 2025-08-15 PROCEDURE — 85025 COMPLETE CBC W/AUTO DIFF WBC: CPT

## 2025-08-15 PROCEDURE — 99497 ADVNCD CARE PLAN 30 MIN: CPT | Performed by: INTERNAL MEDICINE

## 2025-08-15 PROCEDURE — 99223 1ST HOSP IP/OBS HIGH 75: CPT | Mod: 25 | Performed by: INTERNAL MEDICINE

## 2025-08-15 RX ORDER — PROMETHAZINE HYDROCHLORIDE 25 MG/1
12.5-25 TABLET ORAL EVERY 4 HOURS PRN
Status: DISCONTINUED | OUTPATIENT
Start: 2025-08-15 | End: 2025-08-21 | Stop reason: HOSPADM

## 2025-08-15 RX ORDER — ACETAMINOPHEN 325 MG/1
650 TABLET ORAL EVERY 6 HOURS PRN
Status: DISCONTINUED | OUTPATIENT
Start: 2025-08-15 | End: 2025-08-16

## 2025-08-15 RX ORDER — LABETALOL HYDROCHLORIDE 5 MG/ML
10 INJECTION, SOLUTION INTRAVENOUS EVERY 4 HOURS PRN
Status: DISCONTINUED | OUTPATIENT
Start: 2025-08-15 | End: 2025-08-21 | Stop reason: HOSPADM

## 2025-08-15 RX ORDER — ENOXAPARIN SODIUM 100 MG/ML
40 INJECTION SUBCUTANEOUS DAILY
Status: DISCONTINUED | OUTPATIENT
Start: 2025-08-15 | End: 2025-08-21 | Stop reason: HOSPADM

## 2025-08-15 RX ORDER — KETOROLAC TROMETHAMINE 15 MG/ML
15 INJECTION, SOLUTION INTRAMUSCULAR; INTRAVENOUS ONCE
Status: COMPLETED | OUTPATIENT
Start: 2025-08-15 | End: 2025-08-15

## 2025-08-15 RX ORDER — SODIUM CHLORIDE 9 MG/ML
1000 INJECTION, SOLUTION INTRAVENOUS CONTINUOUS
Status: DISCONTINUED | OUTPATIENT
Start: 2025-08-15 | End: 2025-08-16

## 2025-08-15 RX ORDER — PROCHLORPERAZINE EDISYLATE 5 MG/ML
5-10 INJECTION INTRAMUSCULAR; INTRAVENOUS EVERY 4 HOURS PRN
Status: DISCONTINUED | OUTPATIENT
Start: 2025-08-15 | End: 2025-08-21 | Stop reason: HOSPADM

## 2025-08-15 RX ORDER — ONDANSETRON 4 MG/1
4 TABLET, ORALLY DISINTEGRATING ORAL EVERY 4 HOURS PRN
Status: DISCONTINUED | OUTPATIENT
Start: 2025-08-15 | End: 2025-08-21 | Stop reason: HOSPADM

## 2025-08-15 RX ORDER — ALBUTEROL SULFATE 90 UG/1
2 INHALANT RESPIRATORY (INHALATION)
Status: DISCONTINUED | OUTPATIENT
Start: 2025-08-15 | End: 2025-08-21 | Stop reason: HOSPADM

## 2025-08-15 RX ORDER — PROMETHAZINE HYDROCHLORIDE 25 MG/1
12.5-25 SUPPOSITORY RECTAL EVERY 4 HOURS PRN
Status: DISCONTINUED | OUTPATIENT
Start: 2025-08-15 | End: 2025-08-21 | Stop reason: HOSPADM

## 2025-08-15 RX ORDER — ONDANSETRON 2 MG/ML
4 INJECTION INTRAMUSCULAR; INTRAVENOUS EVERY 4 HOURS PRN
Status: DISCONTINUED | OUTPATIENT
Start: 2025-08-15 | End: 2025-08-21 | Stop reason: HOSPADM

## 2025-08-15 RX ADMIN — KETOROLAC TROMETHAMINE 15 MG: 15 INJECTION, SOLUTION INTRAMUSCULAR; INTRAVENOUS at 23:36

## 2025-08-15 RX ADMIN — ENOXAPARIN SODIUM 40 MG: 100 INJECTION SUBCUTANEOUS at 17:49

## 2025-08-15 RX ADMIN — SODIUM CHLORIDE 1000 ML: 9 INJECTION, SOLUTION INTRAVENOUS at 12:40

## 2025-08-15 ASSESSMENT — ENCOUNTER SYMPTOMS
DIARRHEA: 0
DIZZINESS: 0
HEADACHES: 0
FALLS: 0
VOMITING: 0
FEVER: 0
SPUTUM PRODUCTION: 0
NAUSEA: 0
COUGH: 0
PALPITATIONS: 0
ABDOMINAL PAIN: 0
MYALGIAS: 0
CHILLS: 0
STRIDOR: 0
WEAKNESS: 1
LOSS OF CONSCIOUSNESS: 0
CONSTIPATION: 0
FOCAL WEAKNESS: 0
SHORTNESS OF BREATH: 1
SPEECH CHANGE: 0
TINGLING: 0
SENSORY CHANGE: 0
DEPRESSION: 0

## 2025-08-15 ASSESSMENT — COGNITIVE AND FUNCTIONAL STATUS - GENERAL
DAILY ACTIVITIY SCORE: 18
TOILETING: A LITTLE
SUGGESTED CMS G CODE MODIFIER DAILY ACTIVITY: CK
MOBILITY SCORE: 14
DRESSING REGULAR UPPER BODY CLOTHING: A LITTLE
MOVING FROM LYING ON BACK TO SITTING ON SIDE OF FLAT BED: A LITTLE
TURNING FROM BACK TO SIDE WHILE IN FLAT BAD: A LITTLE
HELP NEEDED FOR BATHING: A LITTLE
CLIMB 3 TO 5 STEPS WITH RAILING: A LOT
MOVING TO AND FROM BED TO CHAIR: A LOT
PERSONAL GROOMING: A LITTLE
WALKING IN HOSPITAL ROOM: A LOT
SUGGESTED CMS G CODE MODIFIER MOBILITY: CL
DRESSING REGULAR LOWER BODY CLOTHING: A LOT
STANDING UP FROM CHAIR USING ARMS: A LOT

## 2025-08-15 ASSESSMENT — LIFESTYLE VARIABLES
HOW MANY TIMES IN THE PAST YEAR HAVE YOU HAD 5 OR MORE DRINKS IN A DAY: 0
TOTAL SCORE: 0
TOTAL SCORE: 0
HAVE YOU EVER FELT YOU SHOULD CUT DOWN ON YOUR DRINKING: NO
TOTAL SCORE: 0
EVER HAD A DRINK FIRST THING IN THE MORNING TO STEADY YOUR NERVES TO GET RID OF A HANGOVER: NO
ON A TYPICAL DAY WHEN YOU DRINK ALCOHOL HOW MANY DRINKS DO YOU HAVE: 0
CONSUMPTION TOTAL: NEGATIVE
AVERAGE NUMBER OF DAYS PER WEEK YOU HAVE A DRINK CONTAINING ALCOHOL: 0
EVER FELT BAD OR GUILTY ABOUT YOUR DRINKING: NO
ALCOHOL_USE: NO
HAVE PEOPLE ANNOYED YOU BY CRITICIZING YOUR DRINKING: NO

## 2025-08-15 ASSESSMENT — PATIENT HEALTH QUESTIONNAIRE - PHQ9
2. FEELING DOWN, DEPRESSED, IRRITABLE, OR HOPELESS: NOT AT ALL
1. LITTLE INTEREST OR PLEASURE IN DOING THINGS: NOT AT ALL
SUM OF ALL RESPONSES TO PHQ9 QUESTIONS 1 AND 2: 0

## 2025-08-15 ASSESSMENT — PAIN DESCRIPTION - PAIN TYPE
TYPE: ACUTE PAIN

## 2025-08-15 ASSESSMENT — FIBROSIS 4 INDEX: FIB4 SCORE: 0.64

## 2025-08-15 ASSESSMENT — COPD QUESTIONNAIRES
DURING THE PAST 4 WEEKS HOW MUCH DID YOU FEEL SHORT OF BREATH: SOME OF THE TIME
COPD SCREENING SCORE: 6
HAVE YOU SMOKED AT LEAST 100 CIGARETTES IN YOUR ENTIRE LIFE: YES
DO YOU EVER COUGH UP ANY MUCUS OR PHLEGM?: YES, A FEW DAYS A WEEK OR MONTH

## 2025-08-16 LAB
ANION GAP SERPL CALC-SCNC: 10 MMOL/L (ref 7–16)
BUN SERPL-MCNC: 17 MG/DL (ref 8–22)
CALCIUM SERPL-MCNC: 8 MG/DL (ref 8.5–10.5)
CHLORIDE SERPL-SCNC: 110 MMOL/L (ref 96–112)
CO2 SERPL-SCNC: 22 MMOL/L (ref 20–33)
CREAT SERPL-MCNC: 0.77 MG/DL (ref 0.5–1.4)
ERYTHROCYTE [DISTWIDTH] IN BLOOD BY AUTOMATED COUNT: 50.4 FL (ref 35.9–50)
GFR SERPLBLD CREATININE-BSD FMLA CKD-EPI: 100 ML/MIN/1.73 M 2
GLUCOSE SERPL-MCNC: 95 MG/DL (ref 65–99)
HCT VFR BLD AUTO: 27 % (ref 42–52)
HGB BLD-MCNC: 8.5 G/DL (ref 14–18)
HGB BLD-MCNC: 8.6 G/DL (ref 14–18)
HGB BLD-MCNC: 9.9 G/DL (ref 14–18)
MCH RBC QN AUTO: 30 PG (ref 27–33)
MCHC RBC AUTO-ENTMCNC: 31.9 G/DL (ref 32.3–36.5)
MCV RBC AUTO: 94.1 FL (ref 81.4–97.8)
PLATELET # BLD AUTO: 678 K/UL (ref 164–446)
PMV BLD AUTO: 9.1 FL (ref 9–12.9)
POTASSIUM SERPL-SCNC: 4 MMOL/L (ref 3.6–5.5)
RBC # BLD AUTO: 2.87 M/UL (ref 4.7–6.1)
SODIUM SERPL-SCNC: 142 MMOL/L (ref 135–145)
WBC # BLD AUTO: 7.9 K/UL (ref 4.8–10.8)

## 2025-08-16 PROCEDURE — A9270 NON-COVERED ITEM OR SERVICE: HCPCS | Performed by: STUDENT IN AN ORGANIZED HEALTH CARE EDUCATION/TRAINING PROGRAM

## 2025-08-16 PROCEDURE — 94669 MECHANICAL CHEST WALL OSCILL: CPT

## 2025-08-16 PROCEDURE — 770006 HCHG ROOM/CARE - MED/SURG/GYN SEMI*

## 2025-08-16 PROCEDURE — 97162 PT EVAL MOD COMPLEX 30 MIN: CPT

## 2025-08-16 PROCEDURE — 99232 SBSQ HOSP IP/OBS MODERATE 35: CPT | Performed by: STUDENT IN AN ORGANIZED HEALTH CARE EDUCATION/TRAINING PROGRAM

## 2025-08-16 PROCEDURE — 97597 DBRDMT OPN WND 1ST 20 CM/<: CPT

## 2025-08-16 PROCEDURE — 36415 COLL VENOUS BLD VENIPUNCTURE: CPT

## 2025-08-16 PROCEDURE — 85027 COMPLETE CBC AUTOMATED: CPT

## 2025-08-16 PROCEDURE — 700111 HCHG RX REV CODE 636 W/ 250 OVERRIDE (IP): Mod: JZ | Performed by: INTERNAL MEDICINE

## 2025-08-16 PROCEDURE — 85018 HEMOGLOBIN: CPT | Mod: 91

## 2025-08-16 PROCEDURE — 80048 BASIC METABOLIC PNL TOTAL CA: CPT

## 2025-08-16 PROCEDURE — 700102 HCHG RX REV CODE 250 W/ 637 OVERRIDE(OP): Performed by: STUDENT IN AN ORGANIZED HEALTH CARE EDUCATION/TRAINING PROGRAM

## 2025-08-16 RX ORDER — OXYCODONE HYDROCHLORIDE 10 MG/1
10 TABLET ORAL EVERY 6 HOURS PRN
Refills: 0 | Status: DISCONTINUED | OUTPATIENT
Start: 2025-08-16 | End: 2025-08-21 | Stop reason: HOSPADM

## 2025-08-16 RX ORDER — ACETAMINOPHEN 500 MG
1000 TABLET ORAL 3 TIMES DAILY
Status: DISCONTINUED | OUTPATIENT
Start: 2025-08-16 | End: 2025-08-21

## 2025-08-16 RX ADMIN — ACETAMINOPHEN 1000 MG: 500 TABLET ORAL at 11:39

## 2025-08-16 RX ADMIN — OXYCODONE HYDROCHLORIDE 10 MG: 10 TABLET ORAL at 19:39

## 2025-08-16 RX ADMIN — ACETAMINOPHEN 1000 MG: 500 TABLET ORAL at 17:00

## 2025-08-16 RX ADMIN — ENOXAPARIN SODIUM 40 MG: 100 INJECTION SUBCUTANEOUS at 16:59

## 2025-08-16 RX ADMIN — OXYCODONE HYDROCHLORIDE 10 MG: 10 TABLET ORAL at 09:49

## 2025-08-16 ASSESSMENT — PAIN DESCRIPTION - PAIN TYPE
TYPE: ACUTE PAIN

## 2025-08-16 ASSESSMENT — ENCOUNTER SYMPTOMS
CARDIOVASCULAR NEGATIVE: 1
EYES NEGATIVE: 1
GASTROINTESTINAL NEGATIVE: 1
PSYCHIATRIC NEGATIVE: 1
WEAKNESS: 1
BACK PAIN: 1
RESPIRATORY NEGATIVE: 1

## 2025-08-17 LAB
ANION GAP SERPL CALC-SCNC: 7 MMOL/L (ref 7–16)
BUN SERPL-MCNC: 19 MG/DL (ref 8–22)
CALCIUM SERPL-MCNC: 8.2 MG/DL (ref 8.5–10.5)
CHLORIDE SERPL-SCNC: 106 MMOL/L (ref 96–112)
CO2 SERPL-SCNC: 24 MMOL/L (ref 20–33)
CREAT SERPL-MCNC: 0.86 MG/DL (ref 0.5–1.4)
ERYTHROCYTE [DISTWIDTH] IN BLOOD BY AUTOMATED COUNT: 49.5 FL (ref 35.9–50)
GFR SERPLBLD CREATININE-BSD FMLA CKD-EPI: 97 ML/MIN/1.73 M 2
GLUCOSE SERPL-MCNC: 94 MG/DL (ref 65–99)
HCT VFR BLD AUTO: 27.8 % (ref 42–52)
HGB BLD-MCNC: 8.5 G/DL (ref 14–18)
HGB BLD-MCNC: 8.7 G/DL (ref 14–18)
MCH RBC QN AUTO: 29.7 PG (ref 27–33)
MCHC RBC AUTO-ENTMCNC: 31.3 G/DL (ref 32.3–36.5)
MCV RBC AUTO: 94.9 FL (ref 81.4–97.8)
PLATELET # BLD AUTO: 646 K/UL (ref 164–446)
PMV BLD AUTO: 8.6 FL (ref 9–12.9)
POTASSIUM SERPL-SCNC: 4.3 MMOL/L (ref 3.6–5.5)
RBC # BLD AUTO: 2.93 M/UL (ref 4.7–6.1)
SODIUM SERPL-SCNC: 137 MMOL/L (ref 135–145)
WBC # BLD AUTO: 6.9 K/UL (ref 4.8–10.8)

## 2025-08-17 PROCEDURE — 770006 HCHG ROOM/CARE - MED/SURG/GYN SEMI*

## 2025-08-17 PROCEDURE — A9270 NON-COVERED ITEM OR SERVICE: HCPCS | Performed by: STUDENT IN AN ORGANIZED HEALTH CARE EDUCATION/TRAINING PROGRAM

## 2025-08-17 PROCEDURE — 85018 HEMOGLOBIN: CPT

## 2025-08-17 PROCEDURE — 700111 HCHG RX REV CODE 636 W/ 250 OVERRIDE (IP): Mod: JZ | Performed by: INTERNAL MEDICINE

## 2025-08-17 PROCEDURE — 94669 MECHANICAL CHEST WALL OSCILL: CPT

## 2025-08-17 PROCEDURE — 36415 COLL VENOUS BLD VENIPUNCTURE: CPT

## 2025-08-17 PROCEDURE — 99232 SBSQ HOSP IP/OBS MODERATE 35: CPT | Performed by: STUDENT IN AN ORGANIZED HEALTH CARE EDUCATION/TRAINING PROGRAM

## 2025-08-17 PROCEDURE — 700102 HCHG RX REV CODE 250 W/ 637 OVERRIDE(OP): Performed by: STUDENT IN AN ORGANIZED HEALTH CARE EDUCATION/TRAINING PROGRAM

## 2025-08-17 RX ADMIN — OXYCODONE HYDROCHLORIDE 10 MG: 10 TABLET ORAL at 01:40

## 2025-08-17 RX ADMIN — ACETAMINOPHEN 1000 MG: 500 TABLET ORAL at 05:45

## 2025-08-17 RX ADMIN — ACETAMINOPHEN 1000 MG: 500 TABLET ORAL at 16:29

## 2025-08-17 RX ADMIN — OXYCODONE HYDROCHLORIDE 10 MG: 10 TABLET ORAL at 09:33

## 2025-08-17 RX ADMIN — OXYCODONE HYDROCHLORIDE 10 MG: 10 TABLET ORAL at 16:29

## 2025-08-17 RX ADMIN — ACETAMINOPHEN 1000 MG: 500 TABLET ORAL at 11:50

## 2025-08-17 RX ADMIN — OXYCODONE HYDROCHLORIDE 10 MG: 10 TABLET ORAL at 22:36

## 2025-08-17 RX ADMIN — ENOXAPARIN SODIUM 40 MG: 100 INJECTION SUBCUTANEOUS at 16:30

## 2025-08-17 ASSESSMENT — PAIN DESCRIPTION - PAIN TYPE
TYPE: ACUTE PAIN

## 2025-08-17 ASSESSMENT — ENCOUNTER SYMPTOMS
WEAKNESS: 1
PSYCHIATRIC NEGATIVE: 1
RESPIRATORY NEGATIVE: 1
BACK PAIN: 1
CARDIOVASCULAR NEGATIVE: 1
EYES NEGATIVE: 1
GASTROINTESTINAL NEGATIVE: 1

## 2025-08-18 PROBLEM — R45.851 SUICIDAL IDEATION: Status: ACTIVE | Noted: 2025-08-18

## 2025-08-18 LAB
ALBUMIN SERPL BCP-MCNC: 2.9 G/DL (ref 3.2–4.9)
ALBUMIN/GLOB SERPL: 0.9 G/DL
ALP SERPL-CCNC: 153 U/L (ref 30–99)
ALT SERPL-CCNC: 18 U/L (ref 2–50)
ANION GAP SERPL CALC-SCNC: 8 MMOL/L (ref 7–16)
AST SERPL-CCNC: 22 U/L (ref 12–45)
BILIRUB SERPL-MCNC: 0.3 MG/DL (ref 0.1–1.5)
BUN SERPL-MCNC: 11 MG/DL (ref 8–22)
CALCIUM ALBUM COR SERPL-MCNC: 9.7 MG/DL (ref 8.5–10.5)
CALCIUM SERPL-MCNC: 8.8 MG/DL (ref 8.5–10.5)
CHLORIDE SERPL-SCNC: 103 MMOL/L (ref 96–112)
CO2 SERPL-SCNC: 26 MMOL/L (ref 20–33)
CREAT SERPL-MCNC: 0.84 MG/DL (ref 0.5–1.4)
ERYTHROCYTE [DISTWIDTH] IN BLOOD BY AUTOMATED COUNT: 48.1 FL (ref 35.9–50)
GFR SERPLBLD CREATININE-BSD FMLA CKD-EPI: 97 ML/MIN/1.73 M 2
GLOBULIN SER CALC-MCNC: 3.4 G/DL (ref 1.9–3.5)
GLUCOSE SERPL-MCNC: 81 MG/DL (ref 65–99)
HCT VFR BLD AUTO: 30.1 % (ref 42–52)
HGB BLD-MCNC: 9.3 G/DL (ref 14–18)
MCH RBC QN AUTO: 29.2 PG (ref 27–33)
MCHC RBC AUTO-ENTMCNC: 30.9 G/DL (ref 32.3–36.5)
MCV RBC AUTO: 94.4 FL (ref 81.4–97.8)
PLATELET # BLD AUTO: 726 K/UL (ref 164–446)
PMV BLD AUTO: 9 FL (ref 9–12.9)
POTASSIUM SERPL-SCNC: 4 MMOL/L (ref 3.6–5.5)
PROT SERPL-MCNC: 6.3 G/DL (ref 6–8.2)
RBC # BLD AUTO: 3.19 M/UL (ref 4.7–6.1)
SODIUM SERPL-SCNC: 137 MMOL/L (ref 135–145)
WBC # BLD AUTO: 6.5 K/UL (ref 4.8–10.8)

## 2025-08-18 PROCEDURE — 97162 PT EVAL MOD COMPLEX 30 MIN: CPT

## 2025-08-18 PROCEDURE — 94669 MECHANICAL CHEST WALL OSCILL: CPT

## 2025-08-18 PROCEDURE — 97166 OT EVAL MOD COMPLEX 45 MIN: CPT

## 2025-08-18 PROCEDURE — 80053 COMPREHEN METABOLIC PANEL: CPT

## 2025-08-18 PROCEDURE — 700102 HCHG RX REV CODE 250 W/ 637 OVERRIDE(OP): Performed by: STUDENT IN AN ORGANIZED HEALTH CARE EDUCATION/TRAINING PROGRAM

## 2025-08-18 PROCEDURE — 770001 HCHG ROOM/CARE - MED/SURG/GYN PRIV*

## 2025-08-18 PROCEDURE — A9270 NON-COVERED ITEM OR SERVICE: HCPCS | Performed by: STUDENT IN AN ORGANIZED HEALTH CARE EDUCATION/TRAINING PROGRAM

## 2025-08-18 PROCEDURE — 85027 COMPLETE CBC AUTOMATED: CPT

## 2025-08-18 PROCEDURE — 99233 SBSQ HOSP IP/OBS HIGH 50: CPT | Performed by: STUDENT IN AN ORGANIZED HEALTH CARE EDUCATION/TRAINING PROGRAM

## 2025-08-18 PROCEDURE — RXMED WILLOW AMBULATORY MEDICATION CHARGE: Performed by: STUDENT IN AN ORGANIZED HEALTH CARE EDUCATION/TRAINING PROGRAM

## 2025-08-18 PROCEDURE — 97535 SELF CARE MNGMENT TRAINING: CPT

## 2025-08-18 PROCEDURE — 36415 COLL VENOUS BLD VENIPUNCTURE: CPT

## 2025-08-18 RX ORDER — HALOPERIDOL 5 MG/ML
5 INJECTION INTRAMUSCULAR EVERY 6 HOURS PRN
Status: DISCONTINUED | OUTPATIENT
Start: 2025-08-18 | End: 2025-08-21 | Stop reason: HOSPADM

## 2025-08-18 RX ORDER — ACETAMINOPHEN 500 MG
1000 TABLET ORAL 3 TIMES DAILY
Qty: 30 TABLET | Refills: 0 | Status: SHIPPED | OUTPATIENT
Start: 2025-08-18

## 2025-08-18 RX ORDER — OXYCODONE HYDROCHLORIDE 10 MG/1
10 TABLET ORAL EVERY 8 HOURS PRN
Qty: 9 TABLET | Refills: 0 | Status: SHIPPED | OUTPATIENT
Start: 2025-08-18 | End: 2025-08-21

## 2025-08-18 RX ADMIN — OXYCODONE HYDROCHLORIDE 10 MG: 10 TABLET ORAL at 04:37

## 2025-08-18 RX ADMIN — OXYCODONE HYDROCHLORIDE 10 MG: 10 TABLET ORAL at 20:12

## 2025-08-18 RX ADMIN — ACETAMINOPHEN 1000 MG: 500 TABLET ORAL at 11:04

## 2025-08-18 RX ADMIN — ACETAMINOPHEN 1000 MG: 500 TABLET ORAL at 04:00

## 2025-08-18 ASSESSMENT — COGNITIVE AND FUNCTIONAL STATUS - GENERAL
HELP NEEDED FOR BATHING: A LITTLE
DRESSING REGULAR LOWER BODY CLOTHING: A LITTLE
PERSONAL GROOMING: A LITTLE
MOVING TO AND FROM BED TO CHAIR: A LITTLE
CLIMB 3 TO 5 STEPS WITH RAILING: A LOT
SUGGESTED CMS G CODE MODIFIER MOBILITY: CK
STANDING UP FROM CHAIR USING ARMS: A LITTLE
TOILETING: A LITTLE
WALKING IN HOSPITAL ROOM: A LITTLE
MOBILITY SCORE: 19
DAILY ACTIVITIY SCORE: 20
SUGGESTED CMS G CODE MODIFIER DAILY ACTIVITY: CJ

## 2025-08-18 ASSESSMENT — PAIN DESCRIPTION - PAIN TYPE
TYPE: ACUTE PAIN

## 2025-08-18 ASSESSMENT — ENCOUNTER SYMPTOMS
RESPIRATORY NEGATIVE: 1
GASTROINTESTINAL NEGATIVE: 1
CARDIOVASCULAR NEGATIVE: 1
EYES NEGATIVE: 1
BACK PAIN: 1
WEAKNESS: 1

## 2025-08-18 ASSESSMENT — GAIT ASSESSMENTS
ASSISTIVE DEVICE: FRONT WHEEL WALKER
DISTANCE (FEET): 50
GAIT LEVEL OF ASSIST: CONTACT GUARD ASSIST
DEVIATION: ANTALGIC;STEP TO;BRADYKINETIC;SHUFFLED GAIT

## 2025-08-18 ASSESSMENT — ACTIVITIES OF DAILY LIVING (ADL): TOILETING: INDEPENDENT

## 2025-08-19 PROCEDURE — A9270 NON-COVERED ITEM OR SERVICE: HCPCS | Performed by: STUDENT IN AN ORGANIZED HEALTH CARE EDUCATION/TRAINING PROGRAM

## 2025-08-19 PROCEDURE — 700111 HCHG RX REV CODE 636 W/ 250 OVERRIDE (IP): Mod: JZ | Performed by: INTERNAL MEDICINE

## 2025-08-19 PROCEDURE — 700111 HCHG RX REV CODE 636 W/ 250 OVERRIDE (IP): Mod: JZ | Performed by: STUDENT IN AN ORGANIZED HEALTH CARE EDUCATION/TRAINING PROGRAM

## 2025-08-19 PROCEDURE — 99233 SBSQ HOSP IP/OBS HIGH 50: CPT | Performed by: STUDENT IN AN ORGANIZED HEALTH CARE EDUCATION/TRAINING PROGRAM

## 2025-08-19 PROCEDURE — 90832 PSYTX W PT 30 MINUTES: CPT | Performed by: SOCIAL WORKER

## 2025-08-19 PROCEDURE — 700102 HCHG RX REV CODE 250 W/ 637 OVERRIDE(OP): Performed by: STUDENT IN AN ORGANIZED HEALTH CARE EDUCATION/TRAINING PROGRAM

## 2025-08-19 PROCEDURE — 770001 HCHG ROOM/CARE - MED/SURG/GYN PRIV*

## 2025-08-19 PROCEDURE — 94669 MECHANICAL CHEST WALL OSCILL: CPT

## 2025-08-19 RX ADMIN — ACETAMINOPHEN 1000 MG: 500 TABLET ORAL at 16:45

## 2025-08-19 RX ADMIN — ACETAMINOPHEN 1000 MG: 500 TABLET ORAL at 04:10

## 2025-08-19 RX ADMIN — OXYCODONE HYDROCHLORIDE 10 MG: 10 TABLET ORAL at 11:24

## 2025-08-19 RX ADMIN — OXYCODONE HYDROCHLORIDE 10 MG: 10 TABLET ORAL at 20:11

## 2025-08-19 RX ADMIN — ENOXAPARIN SODIUM 40 MG: 100 INJECTION SUBCUTANEOUS at 16:47

## 2025-08-19 RX ADMIN — OXYCODONE HYDROCHLORIDE 10 MG: 10 TABLET ORAL at 03:52

## 2025-08-19 ASSESSMENT — ENCOUNTER SYMPTOMS
EYES NEGATIVE: 1
RESPIRATORY NEGATIVE: 1
GASTROINTESTINAL NEGATIVE: 1
CARDIOVASCULAR NEGATIVE: 1
WEAKNESS: 1
BACK PAIN: 1

## 2025-08-19 ASSESSMENT — PAIN DESCRIPTION - PAIN TYPE
TYPE: ACUTE PAIN

## 2025-08-20 LAB
ANION GAP SERPL CALC-SCNC: 11 MMOL/L (ref 7–16)
BUN SERPL-MCNC: 16 MG/DL (ref 8–22)
CALCIUM SERPL-MCNC: 9 MG/DL (ref 8.5–10.5)
CHLORIDE SERPL-SCNC: 104 MMOL/L (ref 96–112)
CO2 SERPL-SCNC: 23 MMOL/L (ref 20–33)
CREAT SERPL-MCNC: 0.74 MG/DL (ref 0.5–1.4)
ERYTHROCYTE [DISTWIDTH] IN BLOOD BY AUTOMATED COUNT: 49.2 FL (ref 35.9–50)
GFR SERPLBLD CREATININE-BSD FMLA CKD-EPI: 101 ML/MIN/1.73 M 2
GLUCOSE SERPL-MCNC: 95 MG/DL (ref 65–99)
HCT VFR BLD AUTO: 32.7 % (ref 42–52)
HGB BLD-MCNC: 10.3 G/DL (ref 14–18)
MCH RBC QN AUTO: 29.9 PG (ref 27–33)
MCHC RBC AUTO-ENTMCNC: 31.5 G/DL (ref 32.3–36.5)
MCV RBC AUTO: 95.1 FL (ref 81.4–97.8)
PLATELET # BLD AUTO: 670 K/UL (ref 164–446)
PMV BLD AUTO: 8.9 FL (ref 9–12.9)
POTASSIUM SERPL-SCNC: 4.7 MMOL/L (ref 3.6–5.5)
RBC # BLD AUTO: 3.44 M/UL (ref 4.7–6.1)
SODIUM SERPL-SCNC: 138 MMOL/L (ref 135–145)
WBC # BLD AUTO: 8.8 K/UL (ref 4.8–10.8)

## 2025-08-20 PROCEDURE — 700111 HCHG RX REV CODE 636 W/ 250 OVERRIDE (IP): Mod: JZ | Performed by: INTERNAL MEDICINE

## 2025-08-20 PROCEDURE — 85027 COMPLETE CBC AUTOMATED: CPT

## 2025-08-20 PROCEDURE — A9270 NON-COVERED ITEM OR SERVICE: HCPCS | Performed by: STUDENT IN AN ORGANIZED HEALTH CARE EDUCATION/TRAINING PROGRAM

## 2025-08-20 PROCEDURE — 80048 BASIC METABOLIC PNL TOTAL CA: CPT

## 2025-08-20 PROCEDURE — A9270 NON-COVERED ITEM OR SERVICE: HCPCS

## 2025-08-20 PROCEDURE — 99232 SBSQ HOSP IP/OBS MODERATE 35: CPT | Performed by: STUDENT IN AN ORGANIZED HEALTH CARE EDUCATION/TRAINING PROGRAM

## 2025-08-20 PROCEDURE — 36415 COLL VENOUS BLD VENIPUNCTURE: CPT

## 2025-08-20 PROCEDURE — 700102 HCHG RX REV CODE 250 W/ 637 OVERRIDE(OP)

## 2025-08-20 PROCEDURE — 700102 HCHG RX REV CODE 250 W/ 637 OVERRIDE(OP): Performed by: STUDENT IN AN ORGANIZED HEALTH CARE EDUCATION/TRAINING PROGRAM

## 2025-08-20 PROCEDURE — 770001 HCHG ROOM/CARE - MED/SURG/GYN PRIV*

## 2025-08-20 PROCEDURE — 90832 PSYTX W PT 30 MINUTES: CPT

## 2025-08-20 RX ORDER — ALUMINA, MAGNESIA, AND SIMETHICONE 2400; 2400; 240 MG/30ML; MG/30ML; MG/30ML
10 SUSPENSION ORAL 4 TIMES DAILY PRN
Status: DISCONTINUED | OUTPATIENT
Start: 2025-08-20 | End: 2025-08-21 | Stop reason: HOSPADM

## 2025-08-20 RX ADMIN — ACETAMINOPHEN 1000 MG: 500 TABLET ORAL at 17:26

## 2025-08-20 RX ADMIN — OXYCODONE HYDROCHLORIDE 10 MG: 10 TABLET ORAL at 02:23

## 2025-08-20 RX ADMIN — ALUMINUM HYDROXIDE, MAGNESIUM HYDROXIDE, AND DIMETHICONE 10 ML: 400; 400; 40 SUSPENSION ORAL at 20:48

## 2025-08-20 RX ADMIN — ACETAMINOPHEN 1000 MG: 500 TABLET ORAL at 11:22

## 2025-08-20 RX ADMIN — ENOXAPARIN SODIUM 40 MG: 100 INJECTION SUBCUTANEOUS at 17:26

## 2025-08-20 RX ADMIN — OXYCODONE HYDROCHLORIDE 10 MG: 10 TABLET ORAL at 19:52

## 2025-08-20 ASSESSMENT — ANXIETY QUESTIONNAIRES
5. BEING SO RESTLESS THAT IT IS HARD TO SIT STILL: NOT AT ALL
7. FEELING AFRAID AS IF SOMETHING AWFUL MIGHT HAPPEN: NOT AT ALL
3. WORRYING TOO MUCH ABOUT DIFFERENT THINGS: NOT AT ALL
4. TROUBLE RELAXING: NOT AT ALL
1. FEELING NERVOUS, ANXIOUS, OR ON EDGE: NOT AT ALL
IF YOU CHECKED OFF ANY PROBLEMS ON THIS QUESTIONNAIRE, HOW DIFFICULT HAVE THESE PROBLEMS MADE IT FOR YOU TO DO YOUR WORK, TAKE CARE OF THINGS AT HOME, OR GET ALONG WITH OTHER PEOPLE: NOT DIFFICULT AT ALL
2. NOT BEING ABLE TO STOP OR CONTROL WORRYING: NOT AT ALL
6. BECOMING EASILY ANNOYED OR IRRITABLE: NOT AT ALL
GAD7 TOTAL SCORE: 0

## 2025-08-20 ASSESSMENT — PAIN DESCRIPTION - PAIN TYPE
TYPE: ACUTE PAIN

## 2025-08-20 ASSESSMENT — ENCOUNTER SYMPTOMS
RESPIRATORY NEGATIVE: 1
BACK PAIN: 1
WEAKNESS: 1
CARDIOVASCULAR NEGATIVE: 1
EYES NEGATIVE: 1
GASTROINTESTINAL NEGATIVE: 1

## 2025-08-20 ASSESSMENT — PATIENT HEALTH QUESTIONNAIRE - PHQ9
CLINICAL INTERPRETATION OF PHQ2 SCORE: 0
5. POOR APPETITE OR OVEREATING: 0 - NOT AT ALL

## 2025-08-21 ENCOUNTER — PHARMACY VISIT (OUTPATIENT)
Dept: PHARMACY | Facility: MEDICAL CENTER | Age: 65
End: 2025-08-21
Payer: MEDICARE

## 2025-08-21 VITALS
DIASTOLIC BLOOD PRESSURE: 65 MMHG | HEART RATE: 68 BPM | RESPIRATION RATE: 17 BRPM | BODY MASS INDEX: 20.58 KG/M2 | OXYGEN SATURATION: 96 % | SYSTOLIC BLOOD PRESSURE: 116 MMHG | TEMPERATURE: 97 F | WEIGHT: 143.74 LBS | HEIGHT: 70 IN

## 2025-08-21 LAB
ANION GAP SERPL CALC-SCNC: 7 MMOL/L (ref 7–16)
BUN SERPL-MCNC: 15 MG/DL (ref 8–22)
CALCIUM SERPL-MCNC: 8.8 MG/DL (ref 8.5–10.5)
CHLORIDE SERPL-SCNC: 104 MMOL/L (ref 96–112)
CO2 SERPL-SCNC: 26 MMOL/L (ref 20–33)
CREAT SERPL-MCNC: 0.73 MG/DL (ref 0.5–1.4)
ERYTHROCYTE [DISTWIDTH] IN BLOOD BY AUTOMATED COUNT: 47.5 FL (ref 35.9–50)
GFR SERPLBLD CREATININE-BSD FMLA CKD-EPI: 101 ML/MIN/1.73 M 2
GLUCOSE SERPL-MCNC: 89 MG/DL (ref 65–99)
HCT VFR BLD AUTO: 30.7 % (ref 42–52)
HGB BLD-MCNC: 9.8 G/DL (ref 14–18)
MCH RBC QN AUTO: 29.1 PG (ref 27–33)
MCHC RBC AUTO-ENTMCNC: 31.9 G/DL (ref 32.3–36.5)
MCV RBC AUTO: 91.1 FL (ref 81.4–97.8)
PLATELET # BLD AUTO: 652 K/UL (ref 164–446)
PMV BLD AUTO: 8.7 FL (ref 9–12.9)
POTASSIUM SERPL-SCNC: 4.7 MMOL/L (ref 3.6–5.5)
RBC # BLD AUTO: 3.37 M/UL (ref 4.7–6.1)
SODIUM SERPL-SCNC: 137 MMOL/L (ref 135–145)
WBC # BLD AUTO: 7.5 K/UL (ref 4.8–10.8)

## 2025-08-21 PROCEDURE — 700111 HCHG RX REV CODE 636 W/ 250 OVERRIDE (IP): Performed by: INTERNAL MEDICINE

## 2025-08-21 PROCEDURE — A9270 NON-COVERED ITEM OR SERVICE: HCPCS | Performed by: STUDENT IN AN ORGANIZED HEALTH CARE EDUCATION/TRAINING PROGRAM

## 2025-08-21 PROCEDURE — 99232 SBSQ HOSP IP/OBS MODERATE 35: CPT | Performed by: STUDENT IN AN ORGANIZED HEALTH CARE EDUCATION/TRAINING PROGRAM

## 2025-08-21 PROCEDURE — A9270 NON-COVERED ITEM OR SERVICE: HCPCS | Performed by: NURSE PRACTITIONER

## 2025-08-21 PROCEDURE — RXMED WILLOW AMBULATORY MEDICATION CHARGE: Performed by: STUDENT IN AN ORGANIZED HEALTH CARE EDUCATION/TRAINING PROGRAM

## 2025-08-21 PROCEDURE — 85027 COMPLETE CBC AUTOMATED: CPT

## 2025-08-21 PROCEDURE — 97602 WOUND(S) CARE NON-SELECTIVE: CPT

## 2025-08-21 PROCEDURE — 97530 THERAPEUTIC ACTIVITIES: CPT

## 2025-08-21 PROCEDURE — 36415 COLL VENOUS BLD VENIPUNCTURE: CPT

## 2025-08-21 PROCEDURE — 700102 HCHG RX REV CODE 250 W/ 637 OVERRIDE(OP): Performed by: NURSE PRACTITIONER

## 2025-08-21 PROCEDURE — 80048 BASIC METABOLIC PNL TOTAL CA: CPT

## 2025-08-21 PROCEDURE — 99239 HOSP IP/OBS DSCHRG MGMT >30: CPT | Performed by: STUDENT IN AN ORGANIZED HEALTH CARE EDUCATION/TRAINING PROGRAM

## 2025-08-21 PROCEDURE — 700102 HCHG RX REV CODE 250 W/ 637 OVERRIDE(OP): Performed by: STUDENT IN AN ORGANIZED HEALTH CARE EDUCATION/TRAINING PROGRAM

## 2025-08-21 RX ORDER — BUTALBITAL, ACETAMINOPHEN AND CAFFEINE 50; 325; 40 MG/1; MG/1; MG/1
2 TABLET ORAL EVERY 6 HOURS PRN
Status: DISCONTINUED | OUTPATIENT
Start: 2025-08-21 | End: 2025-08-21

## 2025-08-21 RX ORDER — BUTALBITAL, ACETAMINOPHEN AND CAFFEINE 50; 325; 40 MG/1; MG/1; MG/1
1 TABLET ORAL EVERY 6 HOURS PRN
Status: DISCONTINUED | OUTPATIENT
Start: 2025-08-21 | End: 2025-08-21 | Stop reason: HOSPADM

## 2025-08-21 RX ORDER — OXYCODONE HYDROCHLORIDE 10 MG/1
10 TABLET ORAL EVERY 6 HOURS PRN
Qty: 20 TABLET | Refills: 0 | Status: SHIPPED | OUTPATIENT
Start: 2025-08-21 | End: 2025-08-26

## 2025-08-21 RX ORDER — ONDANSETRON 4 MG/1
4 TABLET, ORALLY DISINTEGRATING ORAL EVERY 8 HOURS PRN
Qty: 30 TABLET | Refills: 0 | Status: SHIPPED | OUTPATIENT
Start: 2025-08-21

## 2025-08-21 RX ORDER — ACETAMINOPHEN 325 MG/1
650 TABLET ORAL 3 TIMES DAILY
Status: DISCONTINUED | OUTPATIENT
Start: 2025-08-21 | End: 2025-08-21 | Stop reason: HOSPADM

## 2025-08-21 RX ADMIN — OXYCODONE HYDROCHLORIDE 10 MG: 10 TABLET ORAL at 01:58

## 2025-08-21 RX ADMIN — OXYCODONE HYDROCHLORIDE 10 MG: 10 TABLET ORAL at 09:05

## 2025-08-21 RX ADMIN — ACETAMINOPHEN 650 MG: 325 TABLET ORAL at 12:29

## 2025-08-21 RX ADMIN — ONDANSETRON 4 MG: 4 TABLET, ORALLY DISINTEGRATING ORAL at 02:03

## 2025-08-21 ASSESSMENT — PAIN DESCRIPTION - PAIN TYPE
TYPE: ACUTE PAIN
TYPE: ACUTE PAIN

## 2025-08-21 ASSESSMENT — ENCOUNTER SYMPTOMS
MUSCULOSKELETAL NEGATIVE: 1
EYES NEGATIVE: 1
CONSTITUTIONAL NEGATIVE: 1
NEUROLOGICAL NEGATIVE: 1
GASTROINTESTINAL NEGATIVE: 1
CARDIOVASCULAR NEGATIVE: 1
RESPIRATORY NEGATIVE: 1

## 2025-08-21 ASSESSMENT — COGNITIVE AND FUNCTIONAL STATUS - GENERAL
MOBILITY SCORE: 21
SUGGESTED CMS G CODE MODIFIER MOBILITY: CJ
CLIMB 3 TO 5 STEPS WITH RAILING: A LOT
WALKING IN HOSPITAL ROOM: A LITTLE